# Patient Record
Sex: MALE | Race: WHITE | NOT HISPANIC OR LATINO | Employment: UNEMPLOYED | URBAN - NONMETROPOLITAN AREA
[De-identification: names, ages, dates, MRNs, and addresses within clinical notes are randomized per-mention and may not be internally consistent; named-entity substitution may affect disease eponyms.]

---

## 2024-04-17 ENCOUNTER — HOSPITAL ENCOUNTER (INPATIENT)
Facility: HOSPITAL | Age: 44
LOS: 2 days | Discharge: HOME | DRG: 917 | End: 2024-04-19
Attending: EMERGENCY MEDICINE | Admitting: INTERNAL MEDICINE
Payer: COMMERCIAL

## 2024-04-17 ENCOUNTER — APPOINTMENT (OUTPATIENT)
Dept: GENERAL RADIOLOGY | Facility: HOSPITAL | Age: 44
DRG: 917 | End: 2024-04-17
Payer: COMMERCIAL

## 2024-04-17 ENCOUNTER — APPOINTMENT (OUTPATIENT)
Dept: CT IMAGING | Facility: HOSPITAL | Age: 44
DRG: 917 | End: 2024-04-17
Payer: COMMERCIAL

## 2024-04-17 DIAGNOSIS — J18.9 COMMUNITY ACQUIRED PNEUMONIA, UNSPECIFIED LATERALITY: ICD-10-CM

## 2024-04-17 DIAGNOSIS — Z79.899 POLYPHARMACY: ICD-10-CM

## 2024-04-17 DIAGNOSIS — N17.9 AKI (ACUTE KIDNEY INJURY): ICD-10-CM

## 2024-04-17 DIAGNOSIS — G47.33 OBSTRUCTIVE SLEEP APNEA: Primary | ICD-10-CM

## 2024-04-17 PROBLEM — J96.90 RESPIRATORY FAILURE: Status: ACTIVE | Noted: 2024-04-17

## 2024-04-17 LAB
A-A DO2: 40.8 MMHG (ref 0–300)
ALBUMIN SERPL-MCNC: 4.1 G/DL (ref 3.5–5.2)
ALBUMIN/GLOB SERPL: 1.8 G/DL
ALP SERPL-CCNC: 102 U/L (ref 39–117)
ALT SERPL W P-5'-P-CCNC: 30 U/L (ref 1–41)
AMPHET+METHAMPHET UR QL: NEGATIVE
AMPHETAMINES UR QL: NEGATIVE
ANION GAP SERPL CALCULATED.3IONS-SCNC: 9.9 MMOL/L (ref 5–15)
APTT PPP: 27 SECONDS (ref 26.5–34.5)
ARTERIAL PATENCY WRIST A: ABNORMAL
AST SERPL-CCNC: 36 U/L (ref 1–40)
ATMOSPHERIC PRESS: 727 MMHG
BARBITURATES UR QL SCN: NEGATIVE
BASE EXCESS BLDA CALC-SCNC: 2.7 MMOL/L (ref 0–2)
BASOPHILS # BLD AUTO: 0.05 10*3/MM3 (ref 0–0.2)
BASOPHILS NFR BLD AUTO: 0.4 % (ref 0–1.5)
BDY SITE: ABNORMAL
BENZODIAZ UR QL SCN: POSITIVE
BILIRUB SERPL-MCNC: 0.4 MG/DL (ref 0–1.2)
BILIRUB UR QL STRIP: ABNORMAL
BUN SERPL-MCNC: 10 MG/DL (ref 6–20)
BUN/CREAT SERPL: 7.2 (ref 7–25)
BUPRENORPHINE SERPL-MCNC: POSITIVE NG/ML
CALCIUM SPEC-SCNC: 9.2 MG/DL (ref 8.6–10.5)
CANNABINOIDS SERPL QL: NEGATIVE
CHLORIDE SERPL-SCNC: 103 MMOL/L (ref 98–107)
CLARITY UR: CLEAR
CO2 BLDA-SCNC: 29.4 MMOL/L (ref 22–33)
CO2 SERPL-SCNC: 28.1 MMOL/L (ref 22–29)
COCAINE UR QL: NEGATIVE
COHGB MFR BLD: 1.3 % (ref 0–5)
COLOR UR: ABNORMAL
CREAT SERPL-MCNC: 1.39 MG/DL (ref 0.76–1.27)
CRP SERPL-MCNC: 0.59 MG/DL (ref 0–0.5)
D-LACTATE SERPL-SCNC: 1.7 MMOL/L (ref 0.5–2)
DEPRECATED RDW RBC AUTO: 45.1 FL (ref 37–54)
EGFRCR SERPLBLD CKD-EPI 2021: 63.3 ML/MIN/1.73
EOSINOPHIL # BLD AUTO: 0.29 10*3/MM3 (ref 0–0.4)
EOSINOPHIL NFR BLD AUTO: 2.6 % (ref 0.3–6.2)
ERYTHROCYTE [DISTWIDTH] IN BLOOD BY AUTOMATED COUNT: 14 % (ref 12.3–15.4)
ETHANOL BLD-MCNC: <10 MG/DL (ref 0–10)
ETHANOL UR QL: <0.01 %
FENTANYL UR-MCNC: NEGATIVE NG/ML
FLUAV RNA RESP QL NAA+PROBE: NOT DETECTED
FLUBV RNA RESP QL NAA+PROBE: NOT DETECTED
GEN 5 2HR TROPONIN T REFLEX: 25 NG/L
GLOBULIN UR ELPH-MCNC: 2.3 GM/DL
GLUCOSE BLDC GLUCOMTR-MCNC: 95 MG/DL (ref 70–130)
GLUCOSE SERPL-MCNC: 107 MG/DL (ref 65–99)
GLUCOSE UR STRIP-MCNC: NEGATIVE MG/DL
HCO3 BLDA-SCNC: 28 MMOL/L (ref 20–26)
HCT VFR BLD AUTO: 51.7 % (ref 37.5–51)
HCT VFR BLD CALC: 54 % (ref 38–51)
HGB BLD-MCNC: 17 G/DL (ref 13–17.7)
HGB BLDA-MCNC: 17.6 G/DL (ref 14–18)
HGB UR QL STRIP.AUTO: NEGATIVE
HOLD SPECIMEN: NORMAL
HOLD SPECIMEN: NORMAL
IMM GRANULOCYTES # BLD AUTO: 0.03 10*3/MM3 (ref 0–0.05)
IMM GRANULOCYTES NFR BLD AUTO: 0.3 % (ref 0–0.5)
INHALED O2 CONCENTRATION: 21 %
INR PPP: 0.98 (ref 0.9–1.1)
KETONES UR QL STRIP: NEGATIVE
LEUKOCYTE ESTERASE UR QL STRIP.AUTO: NEGATIVE
LYMPHOCYTES # BLD AUTO: 1.31 10*3/MM3 (ref 0.7–3.1)
LYMPHOCYTES NFR BLD AUTO: 11.6 % (ref 19.6–45.3)
Lab: ABNORMAL
Lab: ABNORMAL
MAGNESIUM SERPL-MCNC: 1.5 MG/DL (ref 1.6–2.6)
MCH RBC QN AUTO: 29 PG (ref 26.6–33)
MCHC RBC AUTO-ENTMCNC: 32.9 G/DL (ref 31.5–35.7)
MCV RBC AUTO: 88.2 FL (ref 79–97)
METHADONE UR QL SCN: NEGATIVE
METHGB BLD QL: 0 % (ref 0–3)
MODALITY: ABNORMAL
MONOCYTES # BLD AUTO: 0.75 10*3/MM3 (ref 0.1–0.9)
MONOCYTES NFR BLD AUTO: 6.6 % (ref 5–12)
NEUTROPHILS NFR BLD AUTO: 78.5 % (ref 42.7–76)
NEUTROPHILS NFR BLD AUTO: 8.89 10*3/MM3 (ref 1.7–7)
NITRITE UR QL STRIP: NEGATIVE
NOTIFIED BY: ABNORMAL
NOTIFIED WHO: ABNORMAL
NRBC BLD AUTO-RTO: 0 /100 WBC (ref 0–0.2)
OPIATES UR QL: NEGATIVE
OXYCODONE UR QL SCN: NEGATIVE
OXYHGB MFR BLDV: 88.2 % (ref 94–99)
PCO2 BLDA: 44.2 MM HG (ref 35–45)
PCO2 TEMP ADJ BLD: ABNORMAL MM[HG]
PCP UR QL SCN: NEGATIVE
PH BLDA: 7.41 PH UNITS (ref 7.35–7.45)
PH UR STRIP.AUTO: 5.5 [PH] (ref 5–8)
PH, TEMP CORRECTED: ABNORMAL
PLATELET # BLD AUTO: 190 10*3/MM3 (ref 140–450)
PMV BLD AUTO: 9.3 FL (ref 6–12)
PO2 BLDA: 52.2 MM HG (ref 83–108)
PO2 TEMP ADJ BLD: ABNORMAL MM[HG]
POTASSIUM SERPL-SCNC: 4.2 MMOL/L (ref 3.5–5.2)
PROCALCITONIN SERPL-MCNC: 0.08 NG/ML (ref 0–0.25)
PROT SERPL-MCNC: 6.4 G/DL (ref 6–8.5)
PROT UR QL STRIP: NEGATIVE
PROTHROMBIN TIME: 13.5 SECONDS (ref 12.1–14.7)
QT INTERVAL: 288 MS
QTC INTERVAL: 403 MS
RBC # BLD AUTO: 5.86 10*6/MM3 (ref 4.14–5.8)
RSV RNA RESP QL NAA+PROBE: NOT DETECTED
SAO2 % BLDCOA: 89.4 % (ref 94–99)
SARS-COV-2 RNA RESP QL NAA+PROBE: NOT DETECTED
SODIUM SERPL-SCNC: 141 MMOL/L (ref 136–145)
SP GR UR STRIP: 1.02 (ref 1–1.03)
TRICYCLICS UR QL SCN: NEGATIVE
TROPONIN T DELTA: 12 NG/L
TROPONIN T SERPL HS-MCNC: 13 NG/L
TSH SERPL DL<=0.05 MIU/L-ACNC: 1.93 UIU/ML (ref 0.27–4.2)
UROBILINOGEN UR QL STRIP: ABNORMAL
VENTILATOR MODE: ABNORMAL
WBC NRBC COR # BLD AUTO: 11.32 10*3/MM3 (ref 3.4–10.8)
WHOLE BLOOD HOLD COAG: NORMAL
WHOLE BLOOD HOLD SPECIMEN: NORMAL

## 2024-04-17 PROCEDURE — 70450 CT HEAD/BRAIN W/O DYE: CPT

## 2024-04-17 PROCEDURE — 93010 ELECTROCARDIOGRAM REPORT: CPT | Performed by: INTERNAL MEDICINE

## 2024-04-17 PROCEDURE — 82948 REAGENT STRIP/BLOOD GLUCOSE: CPT

## 2024-04-17 PROCEDURE — 25010000002 CEFTRIAXONE PER 250 MG: Performed by: STUDENT IN AN ORGANIZED HEALTH CARE EDUCATION/TRAINING PROGRAM

## 2024-04-17 PROCEDURE — 83735 ASSAY OF MAGNESIUM: CPT | Performed by: PHYSICIAN ASSISTANT

## 2024-04-17 PROCEDURE — 94660 CPAP INITIATION&MGMT: CPT

## 2024-04-17 PROCEDURE — 87040 BLOOD CULTURE FOR BACTERIA: CPT | Performed by: PHYSICIAN ASSISTANT

## 2024-04-17 PROCEDURE — 94799 UNLISTED PULMONARY SVC/PX: CPT

## 2024-04-17 PROCEDURE — 70450 CT HEAD/BRAIN W/O DYE: CPT | Performed by: RADIOLOGY

## 2024-04-17 PROCEDURE — 81003 URINALYSIS AUTO W/O SCOPE: CPT | Performed by: PHYSICIAN ASSISTANT

## 2024-04-17 PROCEDURE — 84484 ASSAY OF TROPONIN QUANT: CPT | Performed by: PHYSICIAN ASSISTANT

## 2024-04-17 PROCEDURE — 93005 ELECTROCARDIOGRAM TRACING: CPT | Performed by: INTERNAL MEDICINE

## 2024-04-17 PROCEDURE — 85730 THROMBOPLASTIN TIME PARTIAL: CPT | Performed by: PHYSICIAN ASSISTANT

## 2024-04-17 PROCEDURE — 85025 COMPLETE CBC W/AUTO DIFF WBC: CPT | Performed by: PHYSICIAN ASSISTANT

## 2024-04-17 PROCEDURE — 84300 ASSAY OF URINE SODIUM: CPT | Performed by: INTERNAL MEDICINE

## 2024-04-17 PROCEDURE — 25810000003 SODIUM CHLORIDE 0.9 % SOLUTION: Performed by: STUDENT IN AN ORGANIZED HEALTH CARE EDUCATION/TRAINING PROGRAM

## 2024-04-17 PROCEDURE — 82805 BLOOD GASES W/O2 SATURATION: CPT

## 2024-04-17 PROCEDURE — 25810000003 SODIUM CHLORIDE 0.9 % SOLUTION: Performed by: INTERNAL MEDICINE

## 2024-04-17 PROCEDURE — 71045 X-RAY EXAM CHEST 1 VIEW: CPT

## 2024-04-17 PROCEDURE — 85610 PROTHROMBIN TIME: CPT | Performed by: PHYSICIAN ASSISTANT

## 2024-04-17 PROCEDURE — 25010000002 MAGNESIUM SULFATE IN D5W 1G/100ML (PREMIX) 1-5 GM/100ML-% SOLUTION: Performed by: STUDENT IN AN ORGANIZED HEALTH CARE EDUCATION/TRAINING PROGRAM

## 2024-04-17 PROCEDURE — 84402 ASSAY OF FREE TESTOSTERONE: CPT | Performed by: PHYSICIAN ASSISTANT

## 2024-04-17 PROCEDURE — 83050 HGB METHEMOGLOBIN QUAN: CPT

## 2024-04-17 PROCEDURE — 80053 COMPREHEN METABOLIC PANEL: CPT | Performed by: PHYSICIAN ASSISTANT

## 2024-04-17 PROCEDURE — 71275 CT ANGIOGRAPHY CHEST: CPT | Performed by: RADIOLOGY

## 2024-04-17 PROCEDURE — 25010000002 METHYLPREDNISOLONE PER 40 MG: Performed by: INTERNAL MEDICINE

## 2024-04-17 PROCEDURE — 36415 COLL VENOUS BLD VENIPUNCTURE: CPT

## 2024-04-17 PROCEDURE — 84443 ASSAY THYROID STIM HORMONE: CPT | Performed by: PHYSICIAN ASSISTANT

## 2024-04-17 PROCEDURE — 5A09357 ASSISTANCE WITH RESPIRATORY VENTILATION, LESS THAN 24 CONSECUTIVE HOURS, CONTINUOUS POSITIVE AIRWAY PRESSURE: ICD-10-PCS | Performed by: INTERNAL MEDICINE

## 2024-04-17 PROCEDURE — 87637 SARSCOV2&INF A&B&RSV AMP PRB: CPT | Performed by: PHYSICIAN ASSISTANT

## 2024-04-17 PROCEDURE — 82077 ASSAY SPEC XCP UR&BREATH IA: CPT | Performed by: PHYSICIAN ASSISTANT

## 2024-04-17 PROCEDURE — 86140 C-REACTIVE PROTEIN: CPT | Performed by: PHYSICIAN ASSISTANT

## 2024-04-17 PROCEDURE — 93005 ELECTROCARDIOGRAM TRACING: CPT | Performed by: PHYSICIAN ASSISTANT

## 2024-04-17 PROCEDURE — 82375 ASSAY CARBOXYHB QUANT: CPT

## 2024-04-17 PROCEDURE — 25010000002 PIPERACILLIN SOD-TAZOBACTAM PER 1 G: Performed by: INTERNAL MEDICINE

## 2024-04-17 PROCEDURE — 84145 PROCALCITONIN (PCT): CPT | Performed by: PHYSICIAN ASSISTANT

## 2024-04-17 PROCEDURE — 99223 1ST HOSP IP/OBS HIGH 75: CPT | Performed by: INTERNAL MEDICINE

## 2024-04-17 PROCEDURE — 25510000001 IOPAMIDOL PER 1 ML: Performed by: STUDENT IN AN ORGANIZED HEALTH CARE EDUCATION/TRAINING PROGRAM

## 2024-04-17 PROCEDURE — 25010000002 ENOXAPARIN PER 10 MG: Performed by: INTERNAL MEDICINE

## 2024-04-17 PROCEDURE — 80307 DRUG TEST PRSMV CHEM ANLYZR: CPT | Performed by: PHYSICIAN ASSISTANT

## 2024-04-17 PROCEDURE — 94664 DEMO&/EVAL PT USE INHALER: CPT

## 2024-04-17 PROCEDURE — 71275 CT ANGIOGRAPHY CHEST: CPT

## 2024-04-17 PROCEDURE — 25010000002 NALOXONE PER 1 MG: Performed by: PHYSICIAN ASSISTANT

## 2024-04-17 PROCEDURE — 82570 ASSAY OF URINE CREATININE: CPT | Performed by: INTERNAL MEDICINE

## 2024-04-17 PROCEDURE — 36600 WITHDRAWAL OF ARTERIAL BLOOD: CPT

## 2024-04-17 PROCEDURE — 83605 ASSAY OF LACTIC ACID: CPT | Performed by: PHYSICIAN ASSISTANT

## 2024-04-17 PROCEDURE — 84403 ASSAY OF TOTAL TESTOSTERONE: CPT | Performed by: PHYSICIAN ASSISTANT

## 2024-04-17 PROCEDURE — 25010000002 MAGNESIUM SULFATE 4 GM/100ML SOLUTION: Performed by: INTERNAL MEDICINE

## 2024-04-17 PROCEDURE — 99285 EMERGENCY DEPT VISIT HI MDM: CPT

## 2024-04-17 PROCEDURE — 71045 X-RAY EXAM CHEST 1 VIEW: CPT | Performed by: RADIOLOGY

## 2024-04-17 PROCEDURE — 25810000003 SODIUM CHLORIDE 0.9 % SOLUTION: Performed by: PHYSICIAN ASSISTANT

## 2024-04-17 RX ORDER — IPRATROPIUM BROMIDE AND ALBUTEROL SULFATE 2.5; .5 MG/3ML; MG/3ML
3 SOLUTION RESPIRATORY (INHALATION)
Status: DISCONTINUED | OUTPATIENT
Start: 2024-04-17 | End: 2024-04-19 | Stop reason: HOSPADM

## 2024-04-17 RX ORDER — BISACODYL 10 MG
10 SUPPOSITORY, RECTAL RECTAL DAILY PRN
Status: DISCONTINUED | OUTPATIENT
Start: 2024-04-17 | End: 2024-04-19 | Stop reason: HOSPADM

## 2024-04-17 RX ORDER — ENOXAPARIN SODIUM 100 MG/ML
40 INJECTION SUBCUTANEOUS DAILY
Status: DISCONTINUED | OUTPATIENT
Start: 2024-04-17 | End: 2024-04-19 | Stop reason: HOSPADM

## 2024-04-17 RX ORDER — NICOTINE POLACRILEX 4 MG
15 LOZENGE BUCCAL
Status: DISCONTINUED | OUTPATIENT
Start: 2024-04-17 | End: 2024-04-19 | Stop reason: HOSPADM

## 2024-04-17 RX ORDER — POLYETHYLENE GLYCOL 3350 17 G/17G
17 POWDER, FOR SOLUTION ORAL DAILY PRN
Status: DISCONTINUED | OUTPATIENT
Start: 2024-04-17 | End: 2024-04-19 | Stop reason: HOSPADM

## 2024-04-17 RX ORDER — MAGNESIUM SULFATE 1 G/100ML
1 INJECTION INTRAVENOUS ONCE
Status: COMPLETED | OUTPATIENT
Start: 2024-04-17 | End: 2024-04-17

## 2024-04-17 RX ORDER — AMOXICILLIN 250 MG
2 CAPSULE ORAL 2 TIMES DAILY
Status: DISCONTINUED | OUTPATIENT
Start: 2024-04-17 | End: 2024-04-19 | Stop reason: HOSPADM

## 2024-04-17 RX ORDER — SODIUM CHLORIDE 0.9 % (FLUSH) 0.9 %
10 SYRINGE (ML) INJECTION AS NEEDED
Status: DISCONTINUED | OUTPATIENT
Start: 2024-04-17 | End: 2024-04-19 | Stop reason: HOSPADM

## 2024-04-17 RX ORDER — MAGNESIUM SULFATE HEPTAHYDRATE 40 MG/ML
4 INJECTION, SOLUTION INTRAVENOUS ONCE
Status: COMPLETED | OUTPATIENT
Start: 2024-04-17 | End: 2024-04-17

## 2024-04-17 RX ORDER — NALOXONE HCL 0.4 MG/ML
0.4 VIAL (ML) INJECTION ONCE
Status: COMPLETED | OUTPATIENT
Start: 2024-04-17 | End: 2024-04-17

## 2024-04-17 RX ORDER — BISACODYL 5 MG/1
5 TABLET, DELAYED RELEASE ORAL DAILY PRN
Status: DISCONTINUED | OUTPATIENT
Start: 2024-04-17 | End: 2024-04-19 | Stop reason: HOSPADM

## 2024-04-17 RX ORDER — SODIUM CHLORIDE 9 MG/ML
125 INJECTION, SOLUTION INTRAVENOUS CONTINUOUS
Status: DISCONTINUED | OUTPATIENT
Start: 2024-04-17 | End: 2024-04-19 | Stop reason: HOSPADM

## 2024-04-17 RX ORDER — SODIUM CHLORIDE 9 MG/ML
40 INJECTION, SOLUTION INTRAVENOUS AS NEEDED
Status: DISCONTINUED | OUTPATIENT
Start: 2024-04-17 | End: 2024-04-19 | Stop reason: HOSPADM

## 2024-04-17 RX ORDER — DEXTROSE MONOHYDRATE 25 G/50ML
25 INJECTION, SOLUTION INTRAVENOUS
Status: DISCONTINUED | OUTPATIENT
Start: 2024-04-17 | End: 2024-04-19 | Stop reason: HOSPADM

## 2024-04-17 RX ORDER — SODIUM CHLORIDE 0.9 % (FLUSH) 0.9 %
10 SYRINGE (ML) INJECTION EVERY 12 HOURS SCHEDULED
Status: DISCONTINUED | OUTPATIENT
Start: 2024-04-17 | End: 2024-04-19 | Stop reason: HOSPADM

## 2024-04-17 RX ORDER — METHYLPREDNISOLONE SODIUM SUCCINATE 40 MG/ML
40 INJECTION, POWDER, LYOPHILIZED, FOR SOLUTION INTRAMUSCULAR; INTRAVENOUS EVERY 12 HOURS
Status: COMPLETED | OUTPATIENT
Start: 2024-04-17 | End: 2024-04-18

## 2024-04-17 RX ORDER — GLUCAGON 1 MG/ML
1 KIT INJECTION
Status: DISCONTINUED | OUTPATIENT
Start: 2024-04-17 | End: 2024-04-19 | Stop reason: HOSPADM

## 2024-04-17 RX ORDER — NITROGLYCERIN 0.4 MG/1
0.4 TABLET SUBLINGUAL
Status: DISCONTINUED | OUTPATIENT
Start: 2024-04-17 | End: 2024-04-19 | Stop reason: HOSPADM

## 2024-04-17 RX ADMIN — SODIUM CHLORIDE 1000 ML: 9 INJECTION, SOLUTION INTRAVENOUS at 14:15

## 2024-04-17 RX ADMIN — NALOXONE HYDROCHLORIDE 0.4 MG: 0.4 INJECTION, SOLUTION INTRAMUSCULAR; INTRAVENOUS; SUBCUTANEOUS at 14:15

## 2024-04-17 RX ADMIN — SODIUM CHLORIDE 75 ML/HR: 9 INJECTION, SOLUTION INTRAVENOUS at 20:28

## 2024-04-17 RX ADMIN — ENOXAPARIN SODIUM 40 MG: 40 INJECTION SUBCUTANEOUS at 20:22

## 2024-04-17 RX ADMIN — IPRATROPIUM BROMIDE AND ALBUTEROL SULFATE 3 ML: 2.5; .5 SOLUTION RESPIRATORY (INHALATION) at 19:39

## 2024-04-17 RX ADMIN — MAGNESIUM SULFATE HEPTAHYDRATE 4 G: 40 INJECTION, SOLUTION INTRAVENOUS at 20:22

## 2024-04-17 RX ADMIN — MAGNESIUM SULFATE HEPTAHYDRATE 1 G: 1 INJECTION, SOLUTION INTRAVENOUS at 16:14

## 2024-04-17 RX ADMIN — Medication 10 ML: at 20:23

## 2024-04-17 RX ADMIN — METHYLPREDNISOLONE SODIUM SUCCINATE 40 MG: 40 INJECTION, POWDER, FOR SOLUTION INTRAMUSCULAR; INTRAVENOUS at 20:22

## 2024-04-17 RX ADMIN — SODIUM CHLORIDE 1000 ML: 9 INJECTION, SOLUTION INTRAVENOUS at 16:14

## 2024-04-17 RX ADMIN — IOPAMIDOL 85 ML: 755 INJECTION, SOLUTION INTRAVENOUS at 16:07

## 2024-04-17 RX ADMIN — PIPERACILLIN SODIUM AND TAZOBACTAM SODIUM 3.38 G: 3; .375 INJECTION, POWDER, LYOPHILIZED, FOR SOLUTION INTRAVENOUS at 20:21

## 2024-04-17 RX ADMIN — CEFTRIAXONE 1000 MG: 1 INJECTION, POWDER, FOR SOLUTION INTRAMUSCULAR; INTRAVENOUS at 18:29

## 2024-04-17 NOTE — ED PROVIDER NOTES
Subjective   History of Present Illness  44-year-old male presents secondary to altered mental status with low heart rate and low oxygen level.  Patient's significant other brought him in by private vehicle.  She provides all of his history.  Patient is awake alert though confused.  Patient's baseline status is a normal historian with a past medical history of obstructive sleep apnea, hypertension, chronic low back pain.  Patient apparently does abuse Xanax.  He does take Neurontin 600 mg along with Suboxone according to his significant other.  Patient is snorting the Xanax.  She is concerned that he might have taken too much Xanax.  Patient presented to her residence around 8.  He was confused.  He progressively got worse.  She felt that this could have been due to oversedation and his CO2 level rising where he was not on his CPAP.  She was going to take him to get his CPAP however he progressively got worse so she brought him to the emergency department.  Patient is on testosterone and on human growth hormone.  He had not been sick recently.  He does have an abrasion on his right forehead.  He states he struck a cabinet with his head.      Review of Systems   Constitutional: Negative.  Negative for fever.   HENT: Negative.     Respiratory: Negative.     Cardiovascular: Negative.  Negative for chest pain.   Gastrointestinal: Negative.  Negative for abdominal pain.   Endocrine: Negative.    Genitourinary: Negative.  Negative for dysuria.   Skin: Negative.    Neurological: Negative.    Psychiatric/Behavioral: Negative.     All other systems reviewed and are negative.      Past Medical History:   Diagnosis Date    Depression     Opiate dependence     KANDICE (obstructive sleep apnea)        No Known Allergies    History reviewed. No pertinent surgical history.    History reviewed. No pertinent family history.    Social History     Socioeconomic History    Marital status:    Tobacco Use    Smoking status: Never     Smokeless tobacco: Current   Vaping Use    Vaping status: Never Used   Substance and Sexual Activity    Alcohol use: Not Currently    Drug use: Yes     Types: Benzodiazepines           Objective   Physical Exam  Vitals and nursing note reviewed.   Constitutional:       General: He is in acute distress.      Appearance: He is well-developed. He is ill-appearing, toxic-appearing and diaphoretic.   HENT:      Head: Normocephalic and atraumatic.      Right Ear: External ear normal.      Left Ear: External ear normal.      Nose: Nose normal.   Eyes:      Conjunctiva/sclera: Conjunctivae normal.      Pupils: Pupils are equal, round, and reactive to light.   Neck:      Vascular: No JVD.      Trachea: No tracheal deviation.   Cardiovascular:      Rate and Rhythm: Normal rate and regular rhythm.      Heart sounds: Normal heart sounds. No murmur heard.  Pulmonary:      Effort: Pulmonary effort is normal. No respiratory distress.      Breath sounds: Normal breath sounds. No wheezing.   Abdominal:      General: Bowel sounds are normal.      Palpations: Abdomen is soft.      Tenderness: There is no abdominal tenderness.   Musculoskeletal:         General: No deformity. Normal range of motion.      Cervical back: Normal range of motion and neck supple.   Skin:     General: Skin is warm and dry.      Coloration: Skin is not pale.      Findings: No erythema or rash.      Comments: Very red-faced.   Neurological:      Mental Status: He is alert.      Cranial Nerves: No cranial nerve deficit.      Comments: Awake alert though confused.   Psychiatric:         Behavior: Behavior normal.         Thought Content: Thought content normal.         Procedures       Results for orders placed or performed during the hospital encounter of 04/17/24   COVID-19, FLU A/B, RSV PCR 1 HR TAT - Swab, Nasopharynx    Specimen: Nasopharynx; Swab   Result Value Ref Range    COVID19 Not Detected Not Detected - Ref. Range    Influenza A PCR Not Detected Not  Detected    Influenza B PCR Not Detected Not Detected    RSV, PCR Not Detected Not Detected   Comprehensive Metabolic Panel    Specimen: Blood   Result Value Ref Range    Glucose 107 (H) 65 - 99 mg/dL    BUN 10 6 - 20 mg/dL    Creatinine 1.39 (H) 0.76 - 1.27 mg/dL    Sodium 141 136 - 145 mmol/L    Potassium 4.2 3.5 - 5.2 mmol/L    Chloride 103 98 - 107 mmol/L    CO2 28.1 22.0 - 29.0 mmol/L    Calcium 9.2 8.6 - 10.5 mg/dL    Total Protein 6.4 6.0 - 8.5 g/dL    Albumin 4.1 3.5 - 5.2 g/dL    ALT (SGPT) 30 1 - 41 U/L    AST (SGOT) 36 1 - 40 U/L    Alkaline Phosphatase 102 39 - 117 U/L    Total Bilirubin 0.4 0.0 - 1.2 mg/dL    Globulin 2.3 gm/dL    A/G Ratio 1.8 g/dL    BUN/Creatinine Ratio 7.2 7.0 - 25.0    Anion Gap 9.9 5.0 - 15.0 mmol/L    eGFR 63.3 >60.0 mL/min/1.73   Protime-INR    Specimen: Blood   Result Value Ref Range    Protime 13.5 12.1 - 14.7 Seconds    INR 0.98 0.90 - 1.10   aPTT    Specimen: Blood   Result Value Ref Range    PTT 27.0 26.5 - 34.5 seconds   Lactic Acid, Plasma    Specimen: Blood   Result Value Ref Range    Lactate 1.7 0.5 - 2.0 mmol/L   Procalcitonin    Specimen: Blood   Result Value Ref Range    Procalcitonin 0.08 0.00 - 0.25 ng/mL   High Sensitivity Troponin T    Specimen: Blood   Result Value Ref Range    HS Troponin T 13 <22 ng/L   C-reactive Protein    Specimen: Blood   Result Value Ref Range    C-Reactive Protein 0.59 (H) 0.00 - 0.50 mg/dL   Urinalysis With Culture If Indicated - Urine, Clean Catch    Specimen: Urine, Clean Catch   Result Value Ref Range    Color, UA Dark Yellow (A) Yellow, Straw    Appearance, UA Clear Clear    pH, UA 5.5 5.0 - 8.0    Specific Gravity, UA 1.024 1.005 - 1.030    Glucose, UA Negative Negative    Ketones, UA Negative Negative    Bilirubin, UA Small (1+) (A) Negative    Blood, UA Negative Negative    Protein, UA Negative Negative    Leuk Esterase, UA Negative Negative    Nitrite, UA Negative Negative    Urobilinogen, UA 1.0 E.U./dL 0.2 - 1.0 E.U./dL    Ethanol    Specimen: Blood   Result Value Ref Range    Ethanol <10 0 - 10 mg/dL    Ethanol % <0.010 %   Urine Drug Screen - Urine, Clean Catch    Specimen: Urine, Clean Catch   Result Value Ref Range    THC, Screen, Urine Negative Negative    Phencyclidine (PCP), Urine Negative Negative    Cocaine Screen, Urine Negative Negative    Methamphetamine, Ur Negative Negative    Opiate Screen Negative Negative    Amphetamine Screen, Urine Negative Negative    Benzodiazepine Screen, Urine Positive (A) Negative    Tricyclic Antidepressants Screen Negative Negative    Methadone Screen, Urine Negative Negative    Barbiturates Screen, Urine Negative Negative    Oxycodone Screen, Urine Negative Negative    Buprenorphine, Screen, Urine Positive (A) Negative   Magnesium    Specimen: Blood   Result Value Ref Range    Magnesium 1.5 (L) 1.6 - 2.6 mg/dL   TSH    Specimen: Blood   Result Value Ref Range    TSH 1.930 0.270 - 4.200 uIU/mL   CBC Auto Differential    Specimen: Blood   Result Value Ref Range    WBC 11.32 (H) 3.40 - 10.80 10*3/mm3    RBC 5.86 (H) 4.14 - 5.80 10*6/mm3    Hemoglobin 17.0 13.0 - 17.7 g/dL    Hematocrit 51.7 (H) 37.5 - 51.0 %    MCV 88.2 79.0 - 97.0 fL    MCH 29.0 26.6 - 33.0 pg    MCHC 32.9 31.5 - 35.7 g/dL    RDW 14.0 12.3 - 15.4 %    RDW-SD 45.1 37.0 - 54.0 fl    MPV 9.3 6.0 - 12.0 fL    Platelets 190 140 - 450 10*3/mm3    Neutrophil % 78.5 (H) 42.7 - 76.0 %    Lymphocyte % 11.6 (L) 19.6 - 45.3 %    Monocyte % 6.6 5.0 - 12.0 %    Eosinophil % 2.6 0.3 - 6.2 %    Basophil % 0.4 0.0 - 1.5 %    Immature Grans % 0.3 0.0 - 0.5 %    Neutrophils, Absolute 8.89 (H) 1.70 - 7.00 10*3/mm3    Lymphocytes, Absolute 1.31 0.70 - 3.10 10*3/mm3    Monocytes, Absolute 0.75 0.10 - 0.90 10*3/mm3    Eosinophils, Absolute 0.29 0.00 - 0.40 10*3/mm3    Basophils, Absolute 0.05 0.00 - 0.20 10*3/mm3    Immature Grans, Absolute 0.03 0.00 - 0.05 10*3/mm3    nRBC 0.0 0.0 - 0.2 /100 WBC   Blood Gas, Arterial With Co-Ox    Specimen:  Arterial Blood   Result Value Ref Range    Site Left Brachial     Bharath's Test N/A     pH, Arterial 7.411 7.350 - 7.450 pH units    pCO2, Arterial 44.2 35.0 - 45.0 mm Hg    pO2, Arterial 52.2 (C) 83.0 - 108.0 mm Hg    HCO3, Arterial 28.0 (H) 20.0 - 26.0 mmol/L    Base Excess, Arterial 2.7 (H) 0.0 - 2.0 mmol/L    O2 Saturation, Arterial 89.4 (L) 94.0 - 99.0 %    Hemoglobin, Blood Gas 17.6 14 - 18 g/dL    Hematocrit, Blood Gas 54.0 (H) 38.0 - 51.0 %    Oxyhemoglobin 88.2 (L) 94 - 99 %    Methemoglobin 0.00 0.00 - 3.00 %    Carboxyhemoglobin 1.3 0 - 5 %    A-a DO2 40.8 0.0 - 300.0 mmHg    CO2 Content 29.4 22 - 33 mmol/L    Barometric Pressure for Blood Gas 727 mmHg    Modality Room Air     FIO2 21 %    Ventilator Mode NA     Notified Who MEGAN CHAPIN AND ER RN     Notified By [id number]     Notified Time 04/17/2024 13:57     Collected by [id number]     pH, Temp Corrected      pCO2, Temperature Corrected      pO2, Temperature Corrected     Fentanyl, Urine - Urine, Clean Catch    Specimen: Urine, Clean Catch   Result Value Ref Range    Fentanyl, Urine Negative Negative   High Sensitivity Troponin T 2Hr    Specimen: Blood   Result Value Ref Range    HS Troponin T 25 (H) <22 ng/L    Troponin T Delta 12 (C) >=-4 - <+4 ng/L   POC Glucose Once    Specimen: Blood   Result Value Ref Range    Glucose 95 70 - 130 mg/dL   ECG 12 Lead Other; Sepsis   Result Value Ref Range    QT Interval 288 ms    QTC Interval 403 ms   ECG 12 Lead Altered Mental Status   Result Value Ref Range    QT Interval 328 ms    QTC Interval 396 ms   Green Top (Gel)   Result Value Ref Range    Extra Tube Hold for add-ons.    Lavender Top   Result Value Ref Range    Extra Tube hold for add-on    Gold Top - SST   Result Value Ref Range    Extra Tube Hold for add-ons.    Light Blue Top   Result Value Ref Range    Extra Tube Hold for add-ons.        CT Angiogram Chest Pulmonary Embolism   Final Result   1. No evidence of pulmonary embolus or aortic dissection.   2. Patchy  basilar airspace disease consistent with pneumonia.                   This report was finalized on 4/17/2024 4:14 PM by Elysia Leger M.D..          XR Chest 1 View   Final Result   Low lung volumes with left basilar atelectasis or pneumonia.           This report was finalized on 4/17/2024 3:11 PM by Elysia Leger M.D..          CT Head Without Contrast   Final Result   No acute intracranial process.                   This report was finalized on 4/17/2024 2:55 PM by Elysia Leger M.D..              ED Course  ED Course as of 04/17/24 2014 Wed Apr 17, 2024   1433 ECG 12 Lead Other; Sepsis  Vent. Rate : 118 BPM     Atrial Rate : 118 BPM     P-R Int : 116 ms          QRS Dur :  88 ms      QT Int : 288 ms       P-R-T Axes :  54  49   5 degrees     QTc Int : 403 ms     Sinus tachycardia  Nonspecific T wave abnormality  Abnormal ECG  No previous ECGs available   [ES]   1747 Patient currently still remains on 2 L nasal cannula oxygen with saturations of 93%.  -CTA was negative for PE however there is evidence of what appears to be bibasilar pneumonia more prominent on the right lung base in the left  -Still remains somnolent likely polypharmacy induced given benzos and buprenorphine abuse.    -Currently patient's mother and father at bedside and are agreeable to admit pt   -Electronically signed by Luana Guzmán DO, 04/17/24, 5:48 PM EDT.   [LK]   9144 Cardiac enzyme was 13 with repeat at 25 likely demand driven given persistent tachycardia however patient now is down below 100 bpm, current hr 96bpm.  Electronically signed by Luana Guzmán DO, 04/17/24, 5:50 PM EDT.    Magnesium was also replaced with 1 g IVP  x1  [LK]      ED Course User Index  [ES] Kee Smith MD  [LK] Luana Guzmán DO                                     [facility] reviewed by Radha Benoit DO, [place], Edwin Palmore, MD, Luana Guzmán DO       Medical Decision Making  Problems Addressed:  Community  acquired pneumonia, unspecified laterality: complicated acute illness or injury  Obstructive sleep apnea: complicated acute illness or injury  Polypharmacy: complicated acute illness or injury    Amount and/or Complexity of Data Reviewed  Labs: ordered.  Radiology: ordered.  ECG/medicine tests: ordered. Decision-making details documented in ED Course.    Risk  Prescription drug management.  Decision regarding hospitalization.        Final diagnoses:   Obstructive sleep apnea   Polypharmacy   Community acquired pneumonia, unspecified laterality       ED Disposition  ED Disposition       ED Disposition   Decision to Admit    Condition   --    Comment   Level of Care: Progressive Care [20]   Diagnosis: Respiratory failure [795636]   Admitting Physician: AMERICA CA [[id number]]   Certification: I Certify That Inpatient Hospital Services Are Medically Necessary For Greater Than 2 Midnights                 No follow-up provider specified.       Medication List      No changes were made to your prescriptions during this visit.            Luana Guzmán DO  04/17/24 2014

## 2024-04-17 NOTE — Clinical Note
Level of Care: Medical Telemetry [23]   Diagnosis: Respiratory failure [339551]   Admitting Physician: AMERICA CA [[id number]]

## 2024-04-17 NOTE — CASE MANAGEMENT/SOCIAL WORK
Discharge Planning Assessment   [place]     Patient Name: Stoney Ochoa  MRN: [id number]  Today's Date: 4/17/2024    Admit Date: 4/17/2024    Plan: Spoke with patients family at bedside. Patient lives alone in [place], [place]. Patients spouse Arabella Hand [phone] in [place] is listed as contact. Patients parents were at bedside and was added to patients contacts. Patient has no POA but they stated he had a Living Will on his cell phone. Patient uses a cpap from unknown supplier. Patient has no oxygen or HH. Patient has no PCP or pharmacy. Patient will return to [place] when discharged and friend/family will transport.   Discharge Needs Assessment       Row Name 04/17/24 0776       Living Environment    People in Home alone    Potentially Unsafe Housing Conditions none    In the past 12 months has the electric, gas, oil, or water company threatened to shut off services in your home? Yes    Primary Care Provided by self    Provides Primary Care For no one    Family Caregiver if Needed spouse    Family Caregiver Names ARABELLA BREWER Spouse [phone]    Quality of Family Relationships unable to assess    Able to Return to Prior Arrangements yes       Resource/Environmental Concerns    Resource/Environmental Concerns none    Transportation Concerns none       Transportation Needs    In the past 12 months, has lack of transportation kept you from medical appointments or from getting medications? no    In the past 12 months, has lack of transportation kept you from meetings, work, or from getting things needed for daily living? No       Food Insecurity    Within the past 12 months, you worried that your food would run out before you got the money to buy more. Never true    Within the past 12 months, the food you bought just didn't last and you didn't have money to get more. Never true       Transition Planning    Patient/Family Anticipates Transition to home    Patient/Family Anticipated Services at Transition none     Transportation Anticipated family or friend will provide       Discharge Needs Assessment    Readmission Within the Last 30 Days no previous admission in last 30 days    Equipment Currently Used at Home cpap    Concerns to be Addressed discharge planning    Anticipated Changes Related to Illness none    Equipment Needed After Discharge none                   Discharge Plan       Row Name 04/17/24 1559       Plan    Plan Spoke with patients family at bedside. Patient lives alone in [place], [place]. Patients spouse Arabella Hand [phone] in [place] is listed as contact. Patients parents were at bedside and was added to patients contacts. Patient has no POA but they stated he had a Living Will on his cell phone. Patient uses a cpap from unknown supplier. Patient has no oxygen or HH. Patient has no PCP or pharmacy. Patient will return to [place] when discharged and friend/family will transport.    Patient/Family in Agreement with Plan yes                     Cristina Decker

## 2024-04-17 NOTE — H&P
[facility]   HISTORY AND PHYSICAL    Patient Name: Stoney Ochoa  : 1980  MRN: [id number]  Primary Care Physician:  Provider, No Known  Date of admission: 2024    Subjective   Subjective     Chief Complaint: Somnolence    History of Present Illness the patient is a 44-year-old male with past medical history significant for opiate dependence, depression and obstructive sleep apnea who presents to the emergency department with altered mental status and somnolence.    Patient seen and examined in . His parents are present at bedside.    According to my discussion with ED provider and per chart review, the patient has been using Xanax as illicitly.  Family concerned that patient had taken too much Xanax as today.  Patient also reportedly took 600 mg of Neurontin and Suboxone for which he is prescribed.    On my exam, the patient is very somnolent and unable to provide any history of presenting illness.  He is using his abdominal/secondary muscles of respiration. Parents are unsure of any other presenting symptoms.    Review of Systems   Unable to perform ROS: Mental status change      Personal History     Past Medical History:   Diagnosis Date    Depression     Opiate dependence     KANDICE (obstructive sleep apnea)        History reviewed. No pertinent surgical history.    Family History: family history is not on file.     Social History:  Occasional Alcohol use    Home Medications:   Awaiting pharmacy completion of home medications    Allergies:  No Known Allergies    Objective    Objective     Vitals:   Temp:  [97.9 °F (36.6 °C)] 97.9 °F (36.6 °C)  Heart Rate:  [] 96  Resp:  [17-22] 17  BP: (105-166)/(51-94) 120/66    Physical Exam  Constitutional:       General: He is not in acute distress.     Appearance: He is well-developed. He is obese. He is ill-appearing and diaphoretic.      Interventions: Nasal cannula in place.   HENT:      Head: Normocephalic and atraumatic.   Eyes:       Conjunctiva/sclera: Conjunctivae normal.   Neck:      Trachea: No tracheal deviation.   Cardiovascular:      Rate and Rhythm: Normal rate and regular rhythm.      Pulses:           Dorsalis pedis pulses are 2+ on the right side and 2+ on the left side.      Heart sounds: No murmur heard.     No friction rub. No gallop.      Comments: No significant lower extremity edema  Pulmonary:      Effort: Tachypnea and accessory muscle usage present. No respiratory distress.      Breath sounds: Decreased breath sounds and rhonchi present. No wheezing or rales.   Abdominal:      General: Abdomen is protuberant. Bowel sounds are normal. Bowel sounds are decreased. There is no distension.      Palpations: Abdomen is soft.      Tenderness: There is no abdominal tenderness. There is no guarding.   Skin:     General: Skin is warm and dry.      Findings: No erythema or rash.   Neurological:      Mental Status: He is lethargic.      Comments: Will briefly awaken to verbal and tactile stimuli but quickly falls back to sleep         Result Review    Result Review:  I have personally reviewed the results from the time of this admission to 4/17/2024 19:00 EDT and agree with these findings:  [x]  Laboratory list / accordion  []  Microbiology  [x]  Radiology  [x]  EKG/Telemetry   []  Cardiology/Vascular   []  Pathology  []  Old records  []  Other:  Most notable findings include:     EKG: NSR with non-specific ST-T changes    Results from last 7 days   Lab Units 04/17/24  1408   WBC 10*3/mm3 11.32*   HEMOGLOBIN g/dL 17.0   HEMATOCRIT % 51.7*   PLATELETS 10*3/mm3 190     Results from last 7 days   Lab Units 04/17/24  1408   SODIUM mmol/L 141   POTASSIUM mmol/L 4.2   CHLORIDE mmol/L 103   CO2 mmol/L 28.1   BUN mg/dL 10   CREATININE mg/dL 1.39*   CALCIUM mg/dL 9.2   BILIRUBIN mg/dL 0.4   ALK PHOS U/L 102   ALT (SGPT) U/L 30   AST (SGOT) U/L 36   GLUCOSE mg/dL 107*     Last Urine Toxicity          Latest Ref Rng & Units 4/17/2024   LAST URINE  TOXICITY RESULTS   Amphetamine, Urine Qual Negative Negative    Barbiturates Screen, Urine Negative Negative    Benzodiazepine Screen, Urine Negative Positive    Buprenorphine, Screen, Urine Negative Positive    Cocaine Screen, Urine Negative Negative    Fentanyl, Urine Negative Negative    Methadone Screen , Urine Negative Negative    Methamphetamine, Ur Negative Negative      CT head without contrast:  FINDINGS: There is no CT evidence of hemorrhage. There is no mass, mass  effect or midline shift. Note is made of a dilated perivascular space in  the right basal ganglia. There is no hydrocephalus. The paranasal  sinuses are clear. Bone windows reveal no acute osseous abnormalities.     IMPRESSION:  No acute intracranial process.    CT chest with PE protocol (images reviewed):  FINDINGS: There are no filling defects within the pulmonary arteries to  suggest an embolus. The thoracic aorta is normal in caliber with no  evidence of dissection. The heart is normal in size. There are no  pleural or pericardial effusions. There is no adenopathy. The thyroid  gland is unremarkable. Lung windows reveal patchy airspace disease in  the right greater than left lung bases consistent with pneumonia. The  visualized upper abdomen is unremarkable. Bone windows reveal no acute  osseous abnormalities.     IMPRESSION:  1. No evidence of pulmonary embolus or aortic dissection.  2. Patchy basilar airspace disease consistent with pneumonia.    Assessment / Plan     #Sepsis due to bilateral aspiration pneumonia  #Suspected unintentional benzodiazepine overdose  #Chronic Suboxone use/Chronic opiate dependence  #Acute hypoxemic respiratory failure  #Obstructive sleep apnea  #Mild troponin T elevation, suspect due to respiratory failure and pneumonia  #Obesity with BMI 35.30 kg/m²  #Mild hypomagnesemia  -Patient will be admitted to the progressive care unit for further evaluation and management with fall and aspiration precautions  -I have  requested that respiratory therapy place patient on CPAP at this time and it is to be worn as needed and at bedtime  -I have requested a nurse dysphagia screen as patient currently appears too lethargic for safe oral intake  -Patient will be started on scheduled nebulized inhalants and IV Solu-Medrol  -I started the patient on Zosyn monotherapy  -Will trend patient's EKG given nonspecific ST-T changes  -Repeat cbc and chemistry panel in a.m.   -Patient will receive 4 g magnesium sulfate IV x 1  -Repeat magnesium level in a.m.  -Patient will be started on IV fluid hydration with NS @75 ml/hr    DVT prophylaxis:  Lovenox    CODE STATUS:    Code Status (Patient has no pulse and is not breathing): CPR (Attempt to Resuscitate)  Medical Interventions (Patient has pulse or is breathing): Full Support    Admission Status:  I believe this patient meets inpatient status.    Patient is considered to be high risk patient due to: Acute respiratory failure/respiratory distress, unintentional benzodiazepine overdose and aspiration pneumonia    Radha Benoit,

## 2024-04-18 LAB
A-A DO2: 58.7 MMHG (ref 0–300)
ALBUMIN SERPL-MCNC: 3.8 G/DL (ref 3.5–5.2)
ALBUMIN/GLOB SERPL: 1.5 G/DL
ALP SERPL-CCNC: 91 U/L (ref 39–117)
ALT SERPL W P-5'-P-CCNC: 25 U/L (ref 1–41)
ANION GAP SERPL CALCULATED.3IONS-SCNC: 7.7 MMOL/L (ref 5–15)
ARTERIAL PATENCY WRIST A: POSITIVE
AST SERPL-CCNC: 26 U/L (ref 1–40)
ATMOSPHERIC PRESS: 727 MMHG
BASE EXCESS BLDA CALC-SCNC: -1.9 MMOL/L (ref 0–2)
BASOPHILS # BLD AUTO: 0.04 10*3/MM3 (ref 0–0.2)
BASOPHILS NFR BLD AUTO: 0.2 % (ref 0–1.5)
BDY SITE: ABNORMAL
BILIRUB SERPL-MCNC: 0.9 MG/DL (ref 0–1.2)
BUN SERPL-MCNC: 18 MG/DL (ref 6–20)
BUN/CREAT SERPL: 8.3 (ref 7–25)
CALCIUM SPEC-SCNC: 8.7 MG/DL (ref 8.6–10.5)
CHLORIDE SERPL-SCNC: 105 MMOL/L (ref 98–107)
CO2 BLDA-SCNC: 26.7 MMOL/L (ref 22–33)
CO2 SERPL-SCNC: 24.3 MMOL/L (ref 22–29)
COHGB MFR BLD: 2 % (ref 0–5)
CPAP: 7 CMH2O
CREAT SERPL-MCNC: 2.18 MG/DL (ref 0.76–1.27)
CREAT UR-MCNC: 388.2 MG/DL
DEPRECATED RDW RBC AUTO: 47 FL (ref 37–54)
EGFRCR SERPLBLD CKD-EPI 2021: 36.9 ML/MIN/1.73
EOSINOPHIL # BLD AUTO: 0.02 10*3/MM3 (ref 0–0.4)
EOSINOPHIL NFR BLD AUTO: 0.1 % (ref 0.3–6.2)
ERYTHROCYTE [DISTWIDTH] IN BLOOD BY AUTOMATED COUNT: 14.2 % (ref 12.3–15.4)
GLOBULIN UR ELPH-MCNC: 2.5 GM/DL
GLUCOSE BLDC GLUCOMTR-MCNC: 122 MG/DL (ref 70–130)
GLUCOSE BLDC GLUCOMTR-MCNC: 137 MG/DL (ref 70–130)
GLUCOSE BLDC GLUCOMTR-MCNC: 145 MG/DL (ref 70–130)
GLUCOSE BLDC GLUCOMTR-MCNC: 179 MG/DL (ref 70–130)
GLUCOSE SERPL-MCNC: 143 MG/DL (ref 65–99)
HCO3 BLDA-SCNC: 25.1 MMOL/L (ref 20–26)
HCT VFR BLD AUTO: 48.8 % (ref 37.5–51)
HCT VFR BLD CALC: 47.6 % (ref 38–51)
HGB BLD-MCNC: 15.9 G/DL (ref 13–17.7)
HGB BLDA-MCNC: 15.5 G/DL (ref 14–18)
IMM GRANULOCYTES # BLD AUTO: 0.1 10*3/MM3 (ref 0–0.05)
IMM GRANULOCYTES NFR BLD AUTO: 0.6 % (ref 0–0.5)
INHALED O2 CONCENTRATION: 28 %
LYMPHOCYTES # BLD AUTO: 1.12 10*3/MM3 (ref 0.7–3.1)
LYMPHOCYTES NFR BLD AUTO: 6.6 % (ref 19.6–45.3)
Lab: ABNORMAL
M PNEUMO IGM SER QL: NEGATIVE
MAGNESIUM SERPL-MCNC: 2.7 MG/DL (ref 1.6–2.6)
MCH RBC QN AUTO: 29.5 PG (ref 26.6–33)
MCHC RBC AUTO-ENTMCNC: 32.6 G/DL (ref 31.5–35.7)
MCV RBC AUTO: 90.5 FL (ref 79–97)
METHGB BLD QL: 0.2 % (ref 0–3)
MODALITY: ABNORMAL
MONOCYTES # BLD AUTO: 0.34 10*3/MM3 (ref 0.1–0.9)
MONOCYTES NFR BLD AUTO: 2 % (ref 5–12)
MRSA DNA SPEC QL NAA+PROBE: NORMAL
NEUTROPHILS NFR BLD AUTO: 15.24 10*3/MM3 (ref 1.7–7)
NEUTROPHILS NFR BLD AUTO: 90.5 % (ref 42.7–76)
NRBC BLD AUTO-RTO: 0 /100 WBC (ref 0–0.2)
OXYHGB MFR BLDV: 93.5 % (ref 94–99)
PCO2 BLDA: 50.3 MM HG (ref 35–45)
PCO2 TEMP ADJ BLD: ABNORMAL MM[HG]
PH BLDA: 7.31 PH UNITS (ref 7.35–7.45)
PH, TEMP CORRECTED: ABNORMAL
PLATELET # BLD AUTO: 201 10*3/MM3 (ref 140–450)
PMV BLD AUTO: 9.2 FL (ref 6–12)
PO2 BLDA: 75.6 MM HG (ref 83–108)
PO2 TEMP ADJ BLD: ABNORMAL MM[HG]
POTASSIUM SERPL-SCNC: 5.4 MMOL/L (ref 3.5–5.2)
PROT SERPL-MCNC: 6.3 G/DL (ref 6–8.5)
QT INTERVAL: 328 MS
QT INTERVAL: 344 MS
QTC INTERVAL: 396 MS
QTC INTERVAL: 409 MS
RBC # BLD AUTO: 5.39 10*6/MM3 (ref 4.14–5.8)
SAO2 % BLDCOA: 95.6 % (ref 94–99)
SODIUM SERPL-SCNC: 137 MMOL/L (ref 136–145)
SODIUM UR-SCNC: 103 MMOL/L
VENTILATOR MODE: ABNORMAL
WBC NRBC COR # BLD AUTO: 16.86 10*3/MM3 (ref 3.4–10.8)

## 2024-04-18 PROCEDURE — 82805 BLOOD GASES W/O2 SATURATION: CPT

## 2024-04-18 PROCEDURE — 94660 CPAP INITIATION&MGMT: CPT

## 2024-04-18 PROCEDURE — 94799 UNLISTED PULMONARY SVC/PX: CPT

## 2024-04-18 PROCEDURE — 99233 SBSQ HOSP IP/OBS HIGH 50: CPT | Performed by: INTERNAL MEDICINE

## 2024-04-18 PROCEDURE — 25010000002 PIPERACILLIN SOD-TAZOBACTAM PER 1 G: Performed by: INTERNAL MEDICINE

## 2024-04-18 PROCEDURE — 94761 N-INVAS EAR/PLS OXIMETRY MLT: CPT

## 2024-04-18 PROCEDURE — 82948 REAGENT STRIP/BLOOD GLUCOSE: CPT

## 2024-04-18 PROCEDURE — 87641 MR-STAPH DNA AMP PROBE: CPT | Performed by: INTERNAL MEDICINE

## 2024-04-18 PROCEDURE — 25010000002 METHYLPREDNISOLONE PER 40 MG: Performed by: INTERNAL MEDICINE

## 2024-04-18 PROCEDURE — 94640 AIRWAY INHALATION TREATMENT: CPT

## 2024-04-18 PROCEDURE — 85025 COMPLETE CBC W/AUTO DIFF WBC: CPT | Performed by: INTERNAL MEDICINE

## 2024-04-18 PROCEDURE — 25810000003 SODIUM CHLORIDE 0.9 % SOLUTION: Performed by: INTERNAL MEDICINE

## 2024-04-18 PROCEDURE — 25010000002 ENOXAPARIN PER 10 MG: Performed by: INTERNAL MEDICINE

## 2024-04-18 PROCEDURE — 83050 HGB METHEMOGLOBIN QUAN: CPT

## 2024-04-18 PROCEDURE — 93005 ELECTROCARDIOGRAM TRACING: CPT | Performed by: INTERNAL MEDICINE

## 2024-04-18 PROCEDURE — 36600 WITHDRAWAL OF ARTERIAL BLOOD: CPT

## 2024-04-18 PROCEDURE — 82375 ASSAY CARBOXYHB QUANT: CPT

## 2024-04-18 PROCEDURE — 80053 COMPREHEN METABOLIC PANEL: CPT | Performed by: INTERNAL MEDICINE

## 2024-04-18 PROCEDURE — 93010 ELECTROCARDIOGRAM REPORT: CPT | Performed by: INTERNAL MEDICINE

## 2024-04-18 PROCEDURE — 83735 ASSAY OF MAGNESIUM: CPT | Performed by: INTERNAL MEDICINE

## 2024-04-18 PROCEDURE — 94664 DEMO&/EVAL PT USE INHALER: CPT

## 2024-04-18 PROCEDURE — 86738 MYCOPLASMA ANTIBODY: CPT | Performed by: INTERNAL MEDICINE

## 2024-04-18 RX ORDER — LISINOPRIL 40 MG/1
40 TABLET ORAL DAILY
Status: ON HOLD | COMMUNITY
End: 2024-04-19

## 2024-04-18 RX ORDER — GABAPENTIN 300 MG/1
600 CAPSULE ORAL
COMMUNITY

## 2024-04-18 RX ORDER — MELOXICAM 15 MG/1
15 TABLET ORAL DAILY
Status: ON HOLD | COMMUNITY
End: 2024-04-19

## 2024-04-18 RX ORDER — MELOXICAM 7.5 MG/1
15 TABLET ORAL DAILY
Status: CANCELLED | OUTPATIENT
Start: 2024-04-18

## 2024-04-18 RX ORDER — ACETAMINOPHEN 325 MG/1
650 TABLET ORAL EVERY 6 HOURS PRN
Status: DISCONTINUED | OUTPATIENT
Start: 2024-04-18 | End: 2024-04-19 | Stop reason: HOSPADM

## 2024-04-18 RX ORDER — LISINOPRIL 10 MG/1
40 TABLET ORAL DAILY
Status: CANCELLED | OUTPATIENT
Start: 2024-04-18

## 2024-04-18 RX ORDER — FLUOXETINE 10 MG/1
10 CAPSULE ORAL DAILY
COMMUNITY
Start: 2024-03-26

## 2024-04-18 RX ORDER — FLUOXETINE 10 MG/1
10 CAPSULE ORAL DAILY
Status: DISCONTINUED | OUTPATIENT
Start: 2024-04-18 | End: 2024-04-19 | Stop reason: HOSPADM

## 2024-04-18 RX ORDER — GABAPENTIN 300 MG/1
300 CAPSULE ORAL 2 TIMES DAILY
Status: CANCELLED | OUTPATIENT
Start: 2024-04-18

## 2024-04-18 RX ORDER — GABAPENTIN 300 MG/1
300 CAPSULE ORAL 2 TIMES DAILY
COMMUNITY
Start: 2024-02-23

## 2024-04-18 RX ORDER — BUPRENORPHINE AND NALOXONE 8; 2 MG/1; MG/1
2 FILM, SOLUBLE BUCCAL; SUBLINGUAL DAILY
COMMUNITY
Start: 2024-04-09 | End: 2024-04-23

## 2024-04-18 RX ORDER — BUPRENORPHINE HYDROCHLORIDE AND NALOXONE HYDROCHLORIDE DIHYDRATE 8; 2 MG/1; MG/1
2 TABLET SUBLINGUAL DAILY
Status: DISCONTINUED | OUTPATIENT
Start: 2024-04-18 | End: 2024-04-19 | Stop reason: HOSPADM

## 2024-04-18 RX ORDER — GABAPENTIN 300 MG/1
600 CAPSULE ORAL NIGHTLY
Status: CANCELLED | OUTPATIENT
Start: 2024-04-18

## 2024-04-18 RX ADMIN — SODIUM CHLORIDE 1000 ML: 9 INJECTION, SOLUTION INTRAVENOUS at 10:22

## 2024-04-18 RX ADMIN — PIPERACILLIN SODIUM AND TAZOBACTAM SODIUM 3.38 G: 3; .375 INJECTION, POWDER, LYOPHILIZED, FOR SOLUTION INTRAVENOUS at 01:51

## 2024-04-18 RX ADMIN — IPRATROPIUM BROMIDE AND ALBUTEROL SULFATE 3 ML: 2.5; .5 SOLUTION RESPIRATORY (INHALATION) at 07:33

## 2024-04-18 RX ADMIN — Medication 10 ML: at 08:22

## 2024-04-18 RX ADMIN — METHYLPREDNISOLONE SODIUM SUCCINATE 40 MG: 40 INJECTION, POWDER, FOR SOLUTION INTRAMUSCULAR; INTRAVENOUS at 08:20

## 2024-04-18 RX ADMIN — IPRATROPIUM BROMIDE AND ALBUTEROL SULFATE 3 ML: 2.5; .5 SOLUTION RESPIRATORY (INHALATION) at 13:12

## 2024-04-18 RX ADMIN — Medication 10 ML: at 20:40

## 2024-04-18 RX ADMIN — ENOXAPARIN SODIUM 40 MG: 40 INJECTION SUBCUTANEOUS at 08:21

## 2024-04-18 RX ADMIN — PIPERACILLIN SODIUM AND TAZOBACTAM SODIUM 3.38 G: 3; .375 INJECTION, POWDER, LYOPHILIZED, FOR SOLUTION INTRAVENOUS at 17:07

## 2024-04-18 RX ADMIN — FLUOXETINE HYDROCHLORIDE 10 MG: 10 CAPSULE ORAL at 12:11

## 2024-04-18 RX ADMIN — SODIUM CHLORIDE 1000 ML: 9 INJECTION, SOLUTION INTRAVENOUS at 08:19

## 2024-04-18 RX ADMIN — SODIUM CHLORIDE 125 ML/HR: 9 INJECTION, SOLUTION INTRAVENOUS at 17:11

## 2024-04-18 RX ADMIN — BUPRENORPHINE HYDROCHLORIDE AND NALOXONE HYDROCHLORIDE DIHYDRATE 2 TABLET: 8; 2 TABLET SUBLINGUAL at 11:28

## 2024-04-18 RX ADMIN — PIPERACILLIN SODIUM AND TAZOBACTAM SODIUM 3.38 G: 3; .375 INJECTION, POWDER, LYOPHILIZED, FOR SOLUTION INTRAVENOUS at 10:22

## 2024-04-18 RX ADMIN — IPRATROPIUM BROMIDE AND ALBUTEROL SULFATE 3 ML: 2.5; .5 SOLUTION RESPIRATORY (INHALATION) at 00:13

## 2024-04-18 RX ADMIN — IPRATROPIUM BROMIDE AND ALBUTEROL SULFATE 3 ML: 2.5; .5 SOLUTION RESPIRATORY (INHALATION) at 18:44

## 2024-04-18 RX ADMIN — METHYLPREDNISOLONE SODIUM SUCCINATE 40 MG: 40 INJECTION, POWDER, FOR SOLUTION INTRAMUSCULAR; INTRAVENOUS at 20:40

## 2024-04-18 NOTE — PLAN OF CARE
Goal Outcome Evaluation:  Plan of Care Reviewed With: patient        Progress: improving  Outcome Evaluation: Pt A&Ox4 with father at bedside. Orginally on bipap and transitioned to 2L NC. VSS. NS fluids going at 75 mL/hr. No new needs noted at this time.         Problem: Noninvasive Ventilation Acute  Goal: Effective Unassisted Ventilation and Oxygenation  Outcome: Ongoing, Progressing     Problem: Hypertension Comorbidity  Goal: Blood Pressure in Desired Range  Outcome: Ongoing, Progressing     Problem: Obstructive Sleep Apnea Risk or Actual Comorbidity Management  Goal: Unobstructed Breathing During Sleep  Outcome: Ongoing, Progressing     Problem: Adult Inpatient Plan of Care  Goal: Plan of Care Review  Outcome: Ongoing, Progressing  Flowsheets (Taken 4/18/2024 0514)  Progress: improving  Plan of Care Reviewed With: patient  Outcome Evaluation: Pt A&Ox4 with father at bedside. Orginally on bipap and transitioned to 2L NC. VSS. NS fluids going at 75 mL/hr. No new needs noted at this time.  Goal: Patient-Specific Goal (Individualized)  Outcome: Ongoing, Progressing  Goal: Absence of Hospital-Acquired Illness or Injury  Outcome: Ongoing, Progressing  Intervention: Identify and Manage Fall Risk  Recent Flowsheet Documentation  Taken 4/18/2024 0500 by Caroline Morton, RN  Safety Promotion/Fall Prevention:   safety round/check completed   room organization consistent   nonskid shoes/slippers when out of bed   lighting adjusted   fall prevention program maintained   clutter free environment maintained   assistive device/personal items within reach   activity supervised  Taken 4/18/2024 0300 by Caroline Morton, RN  Safety Promotion/Fall Prevention:   safety round/check completed   room organization consistent   nonskid shoes/slippers when out of bed   lighting adjusted   fall prevention program maintained   clutter free environment maintained   assistive device/personal items within reach   activity supervised  Taken  4/18/2024 0100 by Caroline Morton RN  Safety Promotion/Fall Prevention:   safety round/check completed   room organization consistent   nonskid shoes/slippers when out of bed   lighting adjusted   fall prevention program maintained   clutter free environment maintained   assistive device/personal items within reach   activity supervised  Taken 4/17/2024 2300 by Caroline Morton RN  Safety Promotion/Fall Prevention:   safety round/check completed   room organization consistent   nonskid shoes/slippers when out of bed   lighting adjusted   fall prevention program maintained   clutter free environment maintained   assistive device/personal items within reach   activity supervised  Taken 4/17/2024 2100 by Caroline Morton RN  Safety Promotion/Fall Prevention:   safety round/check completed   room organization consistent   nonskid shoes/slippers when out of bed   lighting adjusted   fall prevention program maintained   clutter free environment maintained   assistive device/personal items within reach   activity supervised  Taken 4/17/2024 1900 by Caroline Morton RN  Safety Promotion/Fall Prevention:   safety round/check completed   room organization consistent   nonskid shoes/slippers when out of bed   lighting adjusted   fall prevention program maintained   clutter free environment maintained   assistive device/personal items within reach   activity supervised  Intervention: Prevent Skin Injury  Recent Flowsheet Documentation  Taken 4/17/2024 2022 by Caroline Morton RN  Skin Protection:   adhesive use limited   pulse oximeter probe site changed   tubing/devices free from skin contact   transparent dressing maintained  Intervention: Prevent and Manage VTE (Venous Thromboembolism) Risk  Recent Flowsheet Documentation  Taken 4/18/2024 0200 by Caroline Morton RN  VTE Prevention/Management: (see MAR) other (see comments)  Range of Motion: active ROM (range of motion) encouraged  Taken 4/17/2024 2022 by Praveen  JOSIE Velazquez  VTE Prevention/Management: (see MAR) other (see comments)  Range of Motion: active ROM (range of motion) encouraged  Intervention: Prevent Infection  Recent Flowsheet Documentation  Taken 4/18/2024 0500 by Caroline Morton RN  Infection Prevention:   hand hygiene promoted   rest/sleep promoted   single patient room provided  Taken 4/18/2024 0300 by Caroline Morton RN  Infection Prevention:   hand hygiene promoted   rest/sleep promoted   single patient room provided  Taken 4/18/2024 0100 by Caroline Morton RN  Infection Prevention:   hand hygiene promoted   rest/sleep promoted   single patient room provided  Taken 4/17/2024 2300 by Caroline Morton RN  Infection Prevention:   hand hygiene promoted   rest/sleep promoted   single patient room provided  Taken 4/17/2024 2100 by Caroline Morton RN  Infection Prevention:   hand hygiene promoted   rest/sleep promoted   single patient room provided  Taken 4/17/2024 1900 by Caroline Morton RN  Infection Prevention:   hand hygiene promoted   rest/sleep promoted   single patient room provided  Goal: Optimal Comfort and Wellbeing  Outcome: Ongoing, Progressing  Intervention: Provide Person-Centered Care  Recent Flowsheet Documentation  Taken 4/18/2024 0200 by Caroline Morton RN  Trust Relationship/Rapport:   care explained   choices provided   thoughts/feelings acknowledged   reassurance provided  Taken 4/17/2024 2022 by Caroline Morton RN  Trust Relationship/Rapport:   care explained   choices provided   thoughts/feelings acknowledged   reassurance provided  Goal: Readiness for Transition of Care  Outcome: Ongoing, Progressing     Problem: Fall Injury Risk  Goal: Absence of Fall and Fall-Related Injury  Outcome: Ongoing, Progressing  Intervention: Promote Injury-Free Environment  Recent Flowsheet Documentation  Taken 4/18/2024 0500 by Caroline Morton RN  Safety Promotion/Fall Prevention:   safety round/check completed   room organization  consistent   nonskid shoes/slippers when out of bed   lighting adjusted   fall prevention program maintained   clutter free environment maintained   assistive device/personal items within reach   activity supervised  Taken 4/18/2024 0300 by Caroline Morton RN  Safety Promotion/Fall Prevention:   safety round/check completed   room organization consistent   nonskid shoes/slippers when out of bed   lighting adjusted   fall prevention program maintained   clutter free environment maintained   assistive device/personal items within reach   activity supervised  Taken 4/18/2024 0100 by Caroline Morton RN  Safety Promotion/Fall Prevention:   safety round/check completed   room organization consistent   nonskid shoes/slippers when out of bed   lighting adjusted   fall prevention program maintained   clutter free environment maintained   assistive device/personal items within reach   activity supervised  Taken 4/17/2024 2300 by Caroline Morton RN  Safety Promotion/Fall Prevention:   safety round/check completed   room organization consistent   nonskid shoes/slippers when out of bed   lighting adjusted   fall prevention program maintained   clutter free environment maintained   assistive device/personal items within reach   activity supervised  Taken 4/17/2024 2100 by Caroline Morton RN  Safety Promotion/Fall Prevention:   safety round/check completed   room organization consistent   nonskid shoes/slippers when out of bed   lighting adjusted   fall prevention program maintained   clutter free environment maintained   assistive device/personal items within reach   activity supervised  Taken 4/17/2024 1900 by Caroline Morton RN  Safety Promotion/Fall Prevention:   safety round/check completed   room organization consistent   nonskid shoes/slippers when out of bed   lighting adjusted   fall prevention program maintained   clutter free environment maintained   assistive device/personal items within reach   activity  supervised     Problem: Obstructive Sleep Apnea Risk or Actual  Goal: Unobstructed Breathing During Sleep  Outcome: Ongoing, Progressing

## 2024-04-18 NOTE — PLAN OF CARE
Goal Outcome Evaluation:  Plan of Care Reviewed With: patient, family           Outcome Evaluation: A/O times 4. VSS. RA. SR. IVF infusing at 125mL/hr. No needs noted at this time. Bed alarm set, call light within reach. Family remains at bedside.

## 2024-04-18 NOTE — PAYOR COMM NOTE
"[facility]  NPI:[id number]    Utilization Review  Contact: Marie Kaiser RN  Phone: [phone]  Fax:[phone]    INITIATE INPATIENT AUTHORIZATION    Stoney Mcintosh (44 y.o. Male)       Date of Birth   1980    Social Security Number       Address   [address] [place] [address]    Home Phone   [phone]    MRN   [id number]       Caodaism   None    Marital Status                               Admission Date   24    Admission Type   Emergency    Admitting Provider   Radha Benoit DO    Attending Provider   Radha Benoit DO    Department, Room/Bed   [facility] PROGRESS CARE, P212/S2       Discharge Date       Discharge Disposition       Discharge Destination                                 Attending Provider: Radha Benoit DO    Allergies: Tetracycline    Isolation: None   Infection: None   Code Status: CPR    Ht: 193 cm (76\")   Wt: 137 kg (302 lb 0.5 oz)    Admission Cmt: None   Principal Problem: Respiratory failure [J96.90]                   Active Insurance as of 2024       Primary Coverage       Payor Plan Insurance Group Employer/Plan Group    MISC COMMERCIAL [facility]        Coverage Address Coverage Phone Number Coverage Fax Number Effective Dates    [address] [phone]  2024 - None Entered    [facility] [id number]         Subscriber Name Subscriber Birth Date Member ID       STONEY MCINTOSH 1980 [id number]                     Emergency Contacts        (Rel.) Home Phone Work Phone Mobile Phone    JAYLIN BREWER (Spouse) [phone] -- --    DonSheron (Mother) -- -- [phone]    DonAbdiel shepherd (Father) -- -- [phone]                 History & Physical        Radha Benoit DO at 24 1840          [facility]   HISTORY AND PHYSICAL    Patient Name: Stoney Mcintosh  : 1980  MRN: [id number]  Primary Care Physician:  Provider, No Known  Date of admission: " 4/17/2024    Subjective  Subjective     Chief Complaint: Somnolence    History of Present Illness the patient is a 44-year-old male with past medical history significant for opiate dependence, depression and obstructive sleep apnea who presents to the emergency department with altered mental status and somnolence.    Patient seen and examined in . His parents are present at bedside.    According to my discussion with ED provider and per chart review, the patient has been using Xanax as illicitly.  Family concerned that patient had taken too much Xanax as today.  Patient also reportedly took 600 mg of Neurontin and Suboxone for which he is prescribed.    On my exam, the patient is very somnolent and unable to provide any history of presenting illness.  He is using his abdominal/secondary muscles of respiration. Parents are unsure of any other presenting symptoms.    Review of Systems   Unable to perform ROS: Mental status change      Personal History     Past Medical History:   Diagnosis Date    Depression     Opiate dependence     KANDICE (obstructive sleep apnea)        History reviewed. No pertinent surgical history.    Family History: family history is not on file.     Social History:  Occasional Alcohol use    Home Medications:   Awaiting pharmacy completion of home medications    Allergies:  No Known Allergies    Objective   Objective     Vitals:   Temp:  [97.9 °F (36.6 °C)] 97.9 °F (36.6 °C)  Heart Rate:  [] 96  Resp:  [17-22] 17  BP: (105-166)/(51-94) 120/66    Physical Exam  Constitutional:       General: He is not in acute distress.     Appearance: He is well-developed. He is obese. He is ill-appearing and diaphoretic.      Interventions: Nasal cannula in place.   HENT:      Head: Normocephalic and atraumatic.   Eyes:      Conjunctiva/sclera: Conjunctivae normal.   Neck:      Trachea: No tracheal deviation.   Cardiovascular:      Rate and Rhythm: Normal rate and regular rhythm.      Pulses:            Dorsalis pedis pulses are 2+ on the right side and 2+ on the left side.      Heart sounds: No murmur heard.     No friction rub. No gallop.      Comments: No significant lower extremity edema  Pulmonary:      Effort: Tachypnea and accessory muscle usage present. No respiratory distress.      Breath sounds: Decreased breath sounds and rhonchi present. No wheezing or rales.   Abdominal:      General: Abdomen is protuberant. Bowel sounds are normal. Bowel sounds are decreased. There is no distension.      Palpations: Abdomen is soft.      Tenderness: There is no abdominal tenderness. There is no guarding.   Skin:     General: Skin is warm and dry.      Findings: No erythema or rash.   Neurological:      Mental Status: He is lethargic.      Comments: Will briefly awaken to verbal and tactile stimuli but quickly falls back to sleep         Result Review   Result Review:  I have personally reviewed the results from the time of this admission to 4/17/2024 19:00 EDT and agree with these findings:  [x]  Laboratory list / accordion  []  Microbiology  [x]  Radiology  [x]  EKG/Telemetry   []  Cardiology/Vascular   []  Pathology  []  Old records  []  Other:  Most notable findings include:     EKG: NSR with non-specific ST-T changes    Results from last 7 days   Lab Units 04/17/24  1408   WBC 10*3/mm3 11.32*   HEMOGLOBIN g/dL 17.0   HEMATOCRIT % 51.7*   PLATELETS 10*3/mm3 190     Results from last 7 days   Lab Units 04/17/24  1408   SODIUM mmol/L 141   POTASSIUM mmol/L 4.2   CHLORIDE mmol/L 103   CO2 mmol/L 28.1   BUN mg/dL 10   CREATININE mg/dL 1.39*   CALCIUM mg/dL 9.2   BILIRUBIN mg/dL 0.4   ALK PHOS U/L 102   ALT (SGPT) U/L 30   AST (SGOT) U/L 36   GLUCOSE mg/dL 107*     Last Urine Toxicity          Latest Ref Rng & Units 4/17/2024   LAST URINE TOXICITY RESULTS   Amphetamine, Urine Qual Negative Negative    Barbiturates Screen, Urine Negative Negative    Benzodiazepine Screen, Urine Negative Positive    Buprenorphine,  Screen, Urine Negative Positive    Cocaine Screen, Urine Negative Negative    Fentanyl, Urine Negative Negative    Methadone Screen , Urine Negative Negative    Methamphetamine, Ur Negative Negative      CT head without contrast:  FINDINGS: There is no CT evidence of hemorrhage. There is no mass, mass  effect or midline shift. Note is made of a dilated perivascular space in  the right basal ganglia. There is no hydrocephalus. The paranasal  sinuses are clear. Bone windows reveal no acute osseous abnormalities.     IMPRESSION:  No acute intracranial process.    CT chest with PE protocol (images reviewed):  FINDINGS: There are no filling defects within the pulmonary arteries to  suggest an embolus. The thoracic aorta is normal in caliber with no  evidence of dissection. The heart is normal in size. There are no  pleural or pericardial effusions. There is no adenopathy. The thyroid  gland is unremarkable. Lung windows reveal patchy airspace disease in  the right greater than left lung bases consistent with pneumonia. The  visualized upper abdomen is unremarkable. Bone windows reveal no acute  osseous abnormalities.     IMPRESSION:  1. No evidence of pulmonary embolus or aortic dissection.  2. Patchy basilar airspace disease consistent with pneumonia.    Assessment / Plan     #Sepsis due to bilateral aspiration pneumonia  #Suspected unintentional benzodiazepine overdose  #Chronic Suboxone use/Chronic opiate dependence  #Acute hypoxemic respiratory failure  #Obstructive sleep apnea  #Mild troponin T elevation, suspect due to respiratory failure and pneumonia  #Obesity with BMI 35.30 kg/m²  #Mild hypomagnesemia  -Patient will be admitted to the progressive care unit for further evaluation and management with fall and aspiration precautions  -I have requested that respiratory therapy place patient on CPAP at this time and it is to be worn as needed and at bedtime  -I have requested a nurse dysphagia screen as patient  currently appears too lethargic for safe oral intake  -Patient will be started on scheduled nebulized inhalants and IV Solu-Medrol  -I started the patient on Zosyn monotherapy  -Will trend patient's EKG given nonspecific ST-T changes  -Repeat cbc and chemistry panel in a.m.   -Patient will receive 4 g magnesium sulfate IV x 1  -Repeat magnesium level in a.m.  -Patient will be started on IV fluid hydration with NS @75 ml/hr    DVT prophylaxis:  Lovenox    CODE STATUS:    Code Status (Patient has no pulse and is not breathing): CPR (Attempt to Resuscitate)  Medical Interventions (Patient has pulse or is breathing): Full Support    Admission Status:  I believe this patient meets inpatient status.    Patient is considered to be high risk patient due to: Acute respiratory failure/respiratory distress, unintentional benzodiazepine overdose and aspiration pneumonia    Radha Benoit DO    Electronically signed by Radha Benoit DO at 04/17/24 1917          Emergency Department Notes        Luana Guzmán DO at 04/17/24 1349          Subjective   History of Present Illness  44-year-old male presents secondary to altered mental status with low heart rate and low oxygen level.  Patient's significant other brought him in by private vehicle.  She provides all of his history.  Patient is awake alert though confused.  Patient's baseline status is a normal historian with a past medical history of obstructive sleep apnea, hypertension, chronic low back pain.  Patient apparently does abuse Xanax.  He does take Neurontin 600 mg along with Suboxone according to his significant other.  Patient is snorting the Xanax.  She is concerned that he might have taken too much Xanax.  Patient presented to her residence around 8.  He was confused.  He progressively got worse.  She felt that this could have been due to oversedation and his CO2 level rising where he was not on his CPAP.  She was going to take him to get his CPAP  however he progressively got worse so she brought him to the emergency department.  Patient is on testosterone and on human growth hormone.  He had not been sick recently.  He does have an abrasion on his right forehead.  He states he struck a cabinet with his head.      Review of Systems   Constitutional: Negative.  Negative for fever.   HENT: Negative.     Respiratory: Negative.     Cardiovascular: Negative.  Negative for chest pain.   Gastrointestinal: Negative.  Negative for abdominal pain.   Endocrine: Negative.    Genitourinary: Negative.  Negative for dysuria.   Skin: Negative.    Neurological: Negative.    Psychiatric/Behavioral: Negative.     All other systems reviewed and are negative.      Past Medical History:   Diagnosis Date    Depression     Opiate dependence     KANDICE (obstructive sleep apnea)        No Known Allergies    History reviewed. No pertinent surgical history.    History reviewed. No pertinent family history.    Social History     Socioeconomic History    Marital status:    Tobacco Use    Smoking status: Never    Smokeless tobacco: Current   Vaping Use    Vaping status: Never Used   Substance and Sexual Activity    Alcohol use: Not Currently    Drug use: Yes     Types: Benzodiazepines           Objective   Physical Exam  Vitals and nursing note reviewed.   Constitutional:       General: He is in acute distress.      Appearance: He is well-developed. He is ill-appearing, toxic-appearing and diaphoretic.   HENT:      Head: Normocephalic and atraumatic.      Right Ear: External ear normal.      Left Ear: External ear normal.      Nose: Nose normal.   Eyes:      Conjunctiva/sclera: Conjunctivae normal.      Pupils: Pupils are equal, round, and reactive to light.   Neck:      Vascular: No JVD.      Trachea: No tracheal deviation.   Cardiovascular:      Rate and Rhythm: Normal rate and regular rhythm.      Heart sounds: Normal heart sounds. No murmur heard.  Pulmonary:      Effort: Pulmonary  effort is normal. No respiratory distress.      Breath sounds: Normal breath sounds. No wheezing.   Abdominal:      General: Bowel sounds are normal.      Palpations: Abdomen is soft.      Tenderness: There is no abdominal tenderness.   Musculoskeletal:         General: No deformity. Normal range of motion.      Cervical back: Normal range of motion and neck supple.   Skin:     General: Skin is warm and dry.      Coloration: Skin is not pale.      Findings: No erythema or rash.      Comments: Very red-faced.   Neurological:      Mental Status: He is alert.      Cranial Nerves: No cranial nerve deficit.      Comments: Awake alert though confused.   Psychiatric:         Behavior: Behavior normal.         Thought Content: Thought content normal.         Procedures      Results for orders placed or performed during the hospital encounter of 04/17/24   COVID-19, FLU A/B, RSV PCR 1 HR TAT - Swab, Nasopharynx    Specimen: Nasopharynx; Swab   Result Value Ref Range    COVID19 Not Detected Not Detected - Ref. Range    Influenza A PCR Not Detected Not Detected    Influenza B PCR Not Detected Not Detected    RSV, PCR Not Detected Not Detected   Comprehensive Metabolic Panel    Specimen: Blood   Result Value Ref Range    Glucose 107 (H) 65 - 99 mg/dL    BUN 10 6 - 20 mg/dL    Creatinine 1.39 (H) 0.76 - 1.27 mg/dL    Sodium 141 136 - 145 mmol/L    Potassium 4.2 3.5 - 5.2 mmol/L    Chloride 103 98 - 107 mmol/L    CO2 28.1 22.0 - 29.0 mmol/L    Calcium 9.2 8.6 - 10.5 mg/dL    Total Protein 6.4 6.0 - 8.5 g/dL    Albumin 4.1 3.5 - 5.2 g/dL    ALT (SGPT) 30 1 - 41 U/L    AST (SGOT) 36 1 - 40 U/L    Alkaline Phosphatase 102 39 - 117 U/L    Total Bilirubin 0.4 0.0 - 1.2 mg/dL    Globulin 2.3 gm/dL    A/G Ratio 1.8 g/dL    BUN/Creatinine Ratio 7.2 7.0 - 25.0    Anion Gap 9.9 5.0 - 15.0 mmol/L    eGFR 63.3 >60.0 mL/min/1.73   Protime-INR    Specimen: Blood   Result Value Ref Range    Protime 13.5 12.1 - 14.7 Seconds    INR 0.98 0.90 -  1.10   aPTT    Specimen: Blood   Result Value Ref Range    PTT 27.0 26.5 - 34.5 seconds   Lactic Acid, Plasma    Specimen: Blood   Result Value Ref Range    Lactate 1.7 0.5 - 2.0 mmol/L   Procalcitonin    Specimen: Blood   Result Value Ref Range    Procalcitonin 0.08 0.00 - 0.25 ng/mL   High Sensitivity Troponin T    Specimen: Blood   Result Value Ref Range    HS Troponin T 13 <22 ng/L   C-reactive Protein    Specimen: Blood   Result Value Ref Range    C-Reactive Protein 0.59 (H) 0.00 - 0.50 mg/dL   Urinalysis With Culture If Indicated - Urine, Clean Catch    Specimen: Urine, Clean Catch   Result Value Ref Range    Color, UA Dark Yellow (A) Yellow, Straw    Appearance, UA Clear Clear    pH, UA 5.5 5.0 - 8.0    Specific Gravity, UA 1.024 1.005 - 1.030    Glucose, UA Negative Negative    Ketones, UA Negative Negative    Bilirubin, UA Small (1+) (A) Negative    Blood, UA Negative Negative    Protein, UA Negative Negative    Leuk Esterase, UA Negative Negative    Nitrite, UA Negative Negative    Urobilinogen, UA 1.0 E.U./dL 0.2 - 1.0 E.U./dL   Ethanol    Specimen: Blood   Result Value Ref Range    Ethanol <10 0 - 10 mg/dL    Ethanol % <0.010 %   Urine Drug Screen - Urine, Clean Catch    Specimen: Urine, Clean Catch   Result Value Ref Range    THC, Screen, Urine Negative Negative    Phencyclidine (PCP), Urine Negative Negative    Cocaine Screen, Urine Negative Negative    Methamphetamine, Ur Negative Negative    Opiate Screen Negative Negative    Amphetamine Screen, Urine Negative Negative    Benzodiazepine Screen, Urine Positive (A) Negative    Tricyclic Antidepressants Screen Negative Negative    Methadone Screen, Urine Negative Negative    Barbiturates Screen, Urine Negative Negative    Oxycodone Screen, Urine Negative Negative    Buprenorphine, Screen, Urine Positive (A) Negative   Magnesium    Specimen: Blood   Result Value Ref Range    Magnesium 1.5 (L) 1.6 - 2.6 mg/dL   TSH    Specimen: Blood   Result Value Ref  Range    TSH 1.930 0.270 - 4.200 uIU/mL   CBC Auto Differential    Specimen: Blood   Result Value Ref Range    WBC 11.32 (H) 3.40 - 10.80 10*3/mm3    RBC 5.86 (H) 4.14 - 5.80 10*6/mm3    Hemoglobin 17.0 13.0 - 17.7 g/dL    Hematocrit 51.7 (H) 37.5 - 51.0 %    MCV 88.2 79.0 - 97.0 fL    MCH 29.0 26.6 - 33.0 pg    MCHC 32.9 31.5 - 35.7 g/dL    RDW 14.0 12.3 - 15.4 %    RDW-SD 45.1 37.0 - 54.0 fl    MPV 9.3 6.0 - 12.0 fL    Platelets 190 140 - 450 10*3/mm3    Neutrophil % 78.5 (H) 42.7 - 76.0 %    Lymphocyte % 11.6 (L) 19.6 - 45.3 %    Monocyte % 6.6 5.0 - 12.0 %    Eosinophil % 2.6 0.3 - 6.2 %    Basophil % 0.4 0.0 - 1.5 %    Immature Grans % 0.3 0.0 - 0.5 %    Neutrophils, Absolute 8.89 (H) 1.70 - 7.00 10*3/mm3    Lymphocytes, Absolute 1.31 0.70 - 3.10 10*3/mm3    Monocytes, Absolute 0.75 0.10 - 0.90 10*3/mm3    Eosinophils, Absolute 0.29 0.00 - 0.40 10*3/mm3    Basophils, Absolute 0.05 0.00 - 0.20 10*3/mm3    Immature Grans, Absolute 0.03 0.00 - 0.05 10*3/mm3    nRBC 0.0 0.0 - 0.2 /100 WBC   Blood Gas, Arterial With Co-Ox    Specimen: Arterial Blood   Result Value Ref Range    Site Left Brachial     Bharath's Test N/A     pH, Arterial 7.411 7.350 - 7.450 pH units    pCO2, Arterial 44.2 35.0 - 45.0 mm Hg    pO2, Arterial 52.2 (C) 83.0 - 108.0 mm Hg    HCO3, Arterial 28.0 (H) 20.0 - 26.0 mmol/L    Base Excess, Arterial 2.7 (H) 0.0 - 2.0 mmol/L    O2 Saturation, Arterial 89.4 (L) 94.0 - 99.0 %    Hemoglobin, Blood Gas 17.6 14 - 18 g/dL    Hematocrit, Blood Gas 54.0 (H) 38.0 - 51.0 %    Oxyhemoglobin 88.2 (L) 94 - 99 %    Methemoglobin 0.00 0.00 - 3.00 %    Carboxyhemoglobin 1.3 0 - 5 %    A-a DO2 40.8 0.0 - 300.0 mmHg    CO2 Content 29.4 22 - 33 mmol/L    Barometric Pressure for Blood Gas 727 mmHg    Modality Room Air     FIO2 21 %    Ventilator Mode NA     Notified Who MEGAN CHAPIN AND BRITTANY RN     Notified By [id number]     Notified Time 04/17/2024 13:57     Collected by [id number]     pH, Temp Corrected      pCO2, Temperature  Corrected      pO2, Temperature Corrected     Fentanyl, Urine - Urine, Clean Catch    Specimen: Urine, Clean Catch   Result Value Ref Range    Fentanyl, Urine Negative Negative   High Sensitivity Troponin T 2Hr    Specimen: Blood   Result Value Ref Range    HS Troponin T 25 (H) <22 ng/L    Troponin T Delta 12 (C) >=-4 - <+4 ng/L   POC Glucose Once    Specimen: Blood   Result Value Ref Range    Glucose 95 70 - 130 mg/dL   ECG 12 Lead Other; Sepsis   Result Value Ref Range    QT Interval 288 ms    QTC Interval 403 ms   ECG 12 Lead Altered Mental Status   Result Value Ref Range    QT Interval 328 ms    QTC Interval 396 ms   Green Top (Gel)   Result Value Ref Range    Extra Tube Hold for add-ons.    Lavender Top   Result Value Ref Range    Extra Tube hold for add-on    Gold Top - SST   Result Value Ref Range    Extra Tube Hold for add-ons.    Light Blue Top   Result Value Ref Range    Extra Tube Hold for add-ons.        CT Angiogram Chest Pulmonary Embolism   Final Result   1. No evidence of pulmonary embolus or aortic dissection.   2. Patchy basilar airspace disease consistent with pneumonia.                   This report was finalized on 4/17/2024 4:14 PM by Elysia Leger M.D..          XR Chest 1 View   Final Result   Low lung volumes with left basilar atelectasis or pneumonia.           This report was finalized on 4/17/2024 3:11 PM by Elysia Leger M.D..          CT Head Without Contrast   Final Result   No acute intracranial process.                   This report was finalized on 4/17/2024 2:55 PM by Elysia Leger M.D..              ED Course  ED Course as of 04/17/24 2014 Wed Apr 17, 2024   1433 ECG 12 Lead Other; Sepsis  Vent. Rate : 118 BPM     Atrial Rate : 118 BPM     P-R Int : 116 ms          QRS Dur :  88 ms      QT Int : 288 ms       P-R-T Axes :  54  49   5 degrees     QTc Int : 403 ms     Sinus tachycardia  Nonspecific T wave abnormality  Abnormal ECG  No previous ECGs available    [ES]   1747 Patient currently still remains on 2 L nasal cannula oxygen with saturations of 93%.  -CTA was negative for PE however there is evidence of what appears to be bibasilar pneumonia more prominent on the right lung base in the left  -Still remains somnolent likely polypharmacy induced given benzos and buprenorphine abuse.    -Currently patient's mother and father at bedside and are agreeable to admit pt   -Electronically signed by Luana Guzmán DO, 04/17/24, 5:48 PM EDT.   [LK]   1749 Cardiac enzyme was 13 with repeat at 25 likely demand driven given persistent tachycardia however patient now is down below 100 bpm, current hr 96bpm.  Electronically signed by Luana Guzmán DO, 04/17/24, 5:50 PM EDT.    Magnesium was also replaced with 1 g IVP  x1  [LK]      ED Course User Index  [ES] Kee Smith MD  [LK] Luana Guzmán DO                                     ELIZABETH reviewed by America Ca DO, Kee Smith MD, Luana Guzmán DO       Medical Decision Making  Problems Addressed:  Community acquired pneumonia, unspecified laterality: complicated acute illness or injury  Obstructive sleep apnea: complicated acute illness or injury  Polypharmacy: complicated acute illness or injury    Amount and/or Complexity of Data Reviewed  Labs: ordered.  Radiology: ordered.  ECG/medicine tests: ordered. Decision-making details documented in ED Course.    Risk  Prescription drug management.  Decision regarding hospitalization.        Final diagnoses:   Obstructive sleep apnea   Polypharmacy   Community acquired pneumonia, unspecified laterality       ED Disposition  ED Disposition       ED Disposition   Decision to Admit    Condition   --    Comment   Level of Care: Progressive Care [20]   Diagnosis: Respiratory failure [204498]   Admitting Physician: AMERICA CA [[id number]]   Certification: I Certify That Inpatient Hospital Services Are Medically Necessary For Greater Than 2  Midnights                 No follow-up provider specified.       Medication List      No changes were made to your prescriptions during this visit.            Luana Guzmán DO  04/17/24 2014      Electronically signed by Luana Guzmán DO at 04/17/24 2014       Vital Signs (last 2 days)       Date/Time Temp Temp src Pulse Resp BP Patient Position SpO2    04/18/24 0800 97.8 (36.6) Axillary -- -- -- -- --    04/18/24 0733 -- -- 82 16 -- -- 98    04/18/24 0700 -- -- 85 22 105/60 -- 96    04/18/24 0600 -- -- 90 23 90/62 -- 99    04/18/24 0500 -- -- 87 21 101/55 -- 95    04/18/24 0400 97.5 (36.4) Oral 82 14 98/48 -- 96    04/18/24 0356 -- -- 84 15 -- -- 95    04/18/24 0300 -- -- 85 18 105/56 -- 95    04/18/24 0200 -- -- 88 29 109/46 -- 94    04/18/24 0150 -- -- 85 14 -- -- 95    04/18/24 0100 -- -- 89 28 101/53 -- 98    04/18/24 0023 -- -- 87 14 -- -- 97    04/18/24 0013 -- -- 84 14 -- -- 97    04/18/24 0000 97.5 (36.4) Oral 85 27 105/65 -- 97    04/17/24 2300 -- -- 87 -- 95/77 -- 91    04/17/24 2200 -- -- 89 -- 78/33 -- 94    04/17/24 2142 -- -- 89 15 -- -- 95    04/17/24 2100 -- -- 87 -- 117/78 -- 97    04/17/24 2000 99.9 (37.7) Oral 90 18 89/74 -- 98    04/17/24 1939 -- -- 88 15 -- -- 96    04/17/24 1858 -- -- 94 17 -- -- 95    04/17/24 1834 -- -- 96 17 -- -- 94    04/17/24 1831 -- -- -- -- -- -- 96    04/17/24 1830 -- -- 95 -- 120/66 -- --    04/17/24 1815 -- -- 91 -- 137/56 -- --    04/17/24 1812 -- -- -- -- -- -- 94    04/17/24 1800 -- -- 94 -- 124/61 -- 93    04/17/24 1745 -- -- 100 -- 111/51 -- --    04/17/24 1744 -- -- -- -- -- -- 95    04/17/24 1716 -- -- -- -- -- -- 93    04/17/24 1715 -- -- 100 -- 146/70 -- --    04/17/24 1700 -- -- 98 -- 119/93 -- --    04/17/24 1658 -- -- -- -- -- -- 95    04/17/24 1631 -- -- 97 -- -- -- 95    04/17/24 1630 -- -- -- -- 166/71 -- --    04/17/24 1547 -- -- -- -- -- -- 94    04/17/24 1545 -- -- 105 -- 111/94 -- --    04/17/24 1530 -- -- 106 -- 121/82 -- --    04/17/24 1529 --  -- -- -- -- -- 94    04/17/24 1500 -- -- 117 -- 105/58 -- 95    04/17/24 1431 -- -- -- -- -- -- 94    04/17/24 1430 -- -- 115 -- 121/57 -- --    04/17/24 1413 -- -- -- -- -- -- 93    04/17/24 1412 -- -- 120 -- 123/74 -- --    04/17/24 1335 97.9 (36.6) Oral 125 22 151/68 Sitting 94          Facility-Administered Medications as of 4/18/2024   Medication Dose Route Frequency Provider Last Rate Last Admin    sennosides-docusate (PERICOLACE) 8.6-50 MG per tablet 2 tablet  2 tablet Oral BID Radha Benoit DO        And    polyethylene glycol (MIRALAX) packet 17 g  17 g Oral Daily PRN Radha Benoit DO        And    bisacodyl (DULCOLAX) EC tablet 5 mg  5 mg Oral Daily PRN Radha Benoit DO        And    bisacodyl (DULCOLAX) suppository 10 mg  10 mg Rectal Daily PRN Radha Benoit DO        [COMPLETED] cefTRIAXone (ROCEPHIN) 1,000 mg in sodium chloride 0.9 % 100 mL IVPB-VTB  1,000 mg Intravenous Once Luana Guzmán  mL/hr at 04/17/24 1829 1,000 mg at 04/17/24 1829    dextrose (D50W) (25 g/50 mL) IV injection 25 g  25 g Intravenous Q15 Min PRN Radha Benoit DO        dextrose (GLUTOSE) oral gel 15 g  15 g Oral Q15 Min PRN Radha Benoit DO        Enoxaparin Sodium (LOVENOX) syringe 40 mg  40 mg Subcutaneous Daily Radha Benoit DO   40 mg at 04/18/24 0821    glucagon HCl (Diagnostic) injection 1 mg  1 mg Subcutaneous Q15 Min PRN Radha Benoit DO        [COMPLETED] iopamidol (ISOVUE-370) 76 % injection 100 mL  100 mL Intravenous Once in imaging Luana Guzmán DO   85 mL at 04/17/24 1607    ipratropium-albuterol (DUO-NEB) nebulizer solution 3 mL  3 mL Nebulization 4x Daily - RT Radha Benoit DO   3 mL at 04/18/24 0733    [COMPLETED] magnesium sulfate 4g/100mL (PREMIX) infusion  4 g Intravenous Once Radha Benoit DO   4 g at 04/17/24 2022    [COMPLETED] magnesium sulfate in D5W 1g/100mL (PREMIX)  1 g Intravenous Once Luana Guzmán DO   Stopped at  04/17/24 1720    methylPREDNISolone sodium succinate (SOLU-Medrol) injection 40 mg  40 mg Intravenous Q12H Radha Benoit DO   40 mg at 04/18/24 0820    [COMPLETED] naloxone (NARCAN) injection 0.4 mg  0.4 mg Intravenous Once Magdy Dumont PA   0.4 mg at 04/17/24 1415    nitroglycerin (NITROSTAT) SL tablet 0.4 mg  0.4 mg Sublingual Q5 Min PRN Radha Benoit DO        [COMPLETED] piperacillin-tazobactam (ZOSYN) IVPB 3.375 g IVPB in 100 mL NS (VTB)  3.375 g Intravenous Once Radha Benoit DO   3.375 g at 04/17/24 2021    piperacillin-tazobactam (ZOSYN) IVPB 3.375 g IVPB in 100 mL NS (VTB)  3.375 g Intravenous Q8H Radha Benoit DO   3.375 g at 04/18/24 0151    [COMPLETED] sodium chloride 0.9 % bolus 1,000 mL  1,000 mL Intravenous Once Magdy Dumont PA   Stopped at 04/17/24 1520    [COMPLETED] sodium chloride 0.9 % bolus 1,000 mL  1,000 mL Intravenous Once Luana Guzmán DO   Stopped at 04/17/24 1720    sodium chloride 0.9 % bolus 1,000 mL  1,000 mL Intravenous Once Radha Benoit DO 1,000 mL/hr at 04/18/24 0819 1,000 mL at 04/18/24 0819    sodium chloride 0.9 % flush 10 mL  10 mL Intravenous PRN Radha Benoit DO        sodium chloride 0.9 % flush 10 mL  10 mL Intravenous Q12H Radha Benoit DO   10 mL at 04/18/24 0822    sodium chloride 0.9 % flush 10 mL  10 mL Intravenous PRN Radha Benoit DO        sodium chloride 0.9 % infusion 40 mL  40 mL Intravenous PRN Radha Benoit DO        sodium chloride 0.9 % infusion  125 mL/hr Intravenous Continuous Radha Benoit  mL/hr at 04/18/24 0818 125 mL/hr at 04/18/24 0818     Orders (all)        Start     Ordered    04/18/24 0830  sodium chloride 0.9 % bolus 1,000 mL  Once         04/18/24 0743    04/18/24 0800  Oral Care  2 Times Daily       04/17/24 1924    04/18/24 0743  Mycoplasma Pneumoniae Antibody, IgM - Blood,  Once         04/18/24 0742    04/18/24 0742  Respiratory Culture - Sputum,  "Cough  Once         04/18/24 0742    04/18/24 0742  MRSA Screen, PCR (Inpatient) - Swab, Nares  Once         04/18/24 0742    04/18/24 0742  Sodium, Urine, Random - Urine, Clean Catch  Once         04/18/24 0742    04/18/24 0742  Creatinine Urine Random (kidney function) GFR component - Urine, Clean Catch  Once         04/18/24 0742    04/18/24 0707  POC Glucose Once  PROCEDURE ONCE        Comments: Complete no more than 45 minutes prior to patient eating      04/18/24 0659    04/18/24 0621  POC Glucose Once  PROCEDURE ONCE        Comments: Complete no more than 45 minutes prior to patient eating      04/18/24 0144    04/18/24 0600  CBC & Differential  Morning Draw         04/17/24 1924    04/18/24 0600  Comprehensive Metabolic Panel  Morning Draw         04/17/24 1924    04/18/24 0600  Magnesium  Morning Draw         04/17/24 1924    04/18/24 0600  Blood Gas, Arterial -With Co-Ox Panel: Yes  Morning Draw         04/17/24 1924    04/18/24 0600  CBC Auto Differential  PROCEDURE ONCE         04/17/24 2202    04/18/24 0404  Blood Gas, Arterial With Co-Ox  PROCEDURE ONCE         04/18/24 0401    04/18/24 0200  piperacillin-tazobactam (ZOSYN) IVPB 3.375 g IVPB in 100 mL NS (VTB)  Every 8 Hours         04/17/24 1924    04/18/24 0000  POC Glucose Finger Q6H  Every 6 Hours      Comments: Complete no more than 45 minutes prior to patient eating      04/17/24 1924    04/17/24 2218  NIPPV-IPPV / BIPAP / CPAP  As Needed-RT         04/17/24 2218 04/17/24 2100  sodium chloride 0.9 % flush 10 mL  Every 12 Hours Scheduled         04/17/24 1924 04/17/24 2100  sennosides-docusate (PERICOLACE) 8.6-50 MG per tablet 2 tablet  2 Times Daily        Placed in \"And\" Linked Group    04/17/24 1924 04/17/24 2100  methylPREDNISolone sodium succinate (SOLU-Medrol) injection 40 mg  Every 12 Hours         04/17/24 1924 04/17/24 2015  Enoxaparin Sodium (LOVENOX) syringe 40 mg  Daily         04/17/24 1924    04/17/24 2015  magnesium " sulfate 4g/100mL (PREMIX) infusion  Once         04/17/24 1924 04/17/24 2015  piperacillin-tazobactam (ZOSYN) IVPB 3.375 g IVPB in 100 mL NS (VTB)  Once         04/17/24 1924 04/17/24 2015  sodium chloride 0.9 % infusion  Continuous         04/17/24 1924 04/17/24 2004  Inpatient Case Management  Consult  Once        Provider:  (Not yet assigned)    04/17/24 2003 04/17/24 2000  Vital Signs  Every 4 Hours      Comments: Per per hospital policy    04/17/24 1924 04/17/24 1930  ipratropium-albuterol (DUO-NEB) nebulizer solution 3 mL  4 Times Daily - RT         04/17/24 1924 04/17/24 1929  Connectors / Hubs Must Be Scrubbed 15 Seconds Using 70% Alcohol Before Access - Allow to Dry Before Accessing Line  Continuous         04/17/24 1928 04/17/24 1925  Notify Physician (with Parameters)  Until Discontinued         04/17/24 1924 04/17/24 1925  Intake & Output  Every Shift       04/17/24 1924 04/17/24 1925  Weigh patient  Once         04/17/24 1924 04/17/24 1925  Insert Peripheral IV  Once         04/17/24 1924 04/17/24 1925  Saline Lock & Maintain IV Access  Continuous,   Status:  Canceled         04/17/24 1924 04/17/24 1925  Continuous Cardiac Monitoring  Continuous        Comments: Follow Standing Orders As Outlined in Process Instructions (Open Order Report to View Full Instructions)    04/17/24 1924 04/17/24 1925  Maintain IV Access  Continuous         04/17/24 1924 04/17/24 1925  Telemetry - Place Orders & Notify Provider of Results When Patient Experiences Acute Chest Pain, Dysrhythmia or Respiratory Distress  Continuous        Comments: Open Order Report to View Parameters Requiring Provider Notification    04/17/24 1924 04/17/24 1925  May Be Off Telemetry for Tests  Continuous         04/17/24 1924 04/17/24 1925  Fall Precautions  Continuous         04/17/24 1924 04/17/24 1925  Aspiration Precautions  Continuous         04/17/24 1924 04/17/24 1925   "Nursing Dysphagia Screening (Complete Prior to Giving Anything By Mouth)  Once         04/17/24 1924    04/17/24 1925  NPO Diet NPO Type: Strict NPO  Diet Effective Now         04/17/24 1924 04/17/24 1925  NIPPV-IPPV / BIPAP / CPAP  At Bedtime & As Needed-RT,   Status:  Canceled         04/17/24 1924    04/17/24 1925  ECG 12 Lead Altered Mental Status  Now & in 6 Hours       04/17/24 1924 04/17/24 1924  nitroglycerin (NITROSTAT) SL tablet 0.4 mg  Every 5 Minutes PRN         04/17/24 1924    04/17/24 1924  polyethylene glycol (MIRALAX) packet 17 g  Daily PRN        Placed in \"And\" Linked Group    04/17/24 1924    04/17/24 1924  bisacodyl (DULCOLAX) EC tablet 5 mg  Daily PRN        Placed in \"And\" Linked Group    04/17/24 1924    04/17/24 1924  bisacodyl (DULCOLAX) suppository 10 mg  Daily PRN        Placed in \"And\" Linked Group    04/17/24 1924    04/17/24 1924  dextrose (GLUTOSE) oral gel 15 g  Every 15 Minutes PRN         04/17/24 1924    04/17/24 1924  dextrose (D50W) (25 g/50 mL) IV injection 25 g  Every 15 Minutes PRN         04/17/24 1924    04/17/24 1924  glucagon HCl (Diagnostic) injection 1 mg  Every 15 Minutes PRN         04/17/24 1924 04/17/24 1924  sodium chloride 0.9 % flush 10 mL  As Needed         04/17/24 1924 04/17/24 1924  sodium chloride 0.9 % infusion 40 mL  As Needed         04/17/24 1924 04/17/24 1840  Inpatient Admission  Once         04/17/24 1839    04/17/24 1804  Code Status and Medical Interventions:  Continuous         04/17/24 1806    04/17/24 1803  Initiate Observation Status  Once,   Status:  Canceled         04/17/24 1806    04/17/24 1801  cefTRIAXone (ROCEPHIN) 1,000 mg in sodium chloride 0.9 % 100 mL IVPB-VTB  Once         04/17/24 1745    04/17/24 1623  iopamidol (ISOVUE-370) 76 % injection 100 mL  Once in Imaging         04/17/24 1607    04/17/24 1608  High Sensitivity Troponin T 2Hr  PROCEDURE ONCE         04/17/24 1456    04/17/24 1555  sodium chloride 0.9 % bolus " 1,000 mL  Once         04/17/24 1539    04/17/24 1555  magnesium sulfate in D5W 1g/100mL (PREMIX)  Once         04/17/24 1539    04/17/24 1536  CT Angiogram Chest Pulmonary Embolism  1 Time Imaging         04/17/24 1535    04/17/24 1432  POC Glucose Once  PROCEDURE ONCE        Comments: Complete no more than 45 minutes prior to patient eating      04/17/24 1425    04/17/24 1425  Fentanyl, Urine - Urine, Clean Catch  Once         04/17/24 1424    04/17/24 1408  naloxone (NARCAN) injection 0.4 mg  Once         04/17/24 1352    04/17/24 1405  sodium chloride 0.9 % bolus 1,000 mL  Once         04/17/24 1349    04/17/24 1353  Blood Gas, Arterial With Co-Ox  PROCEDURE ONCE         04/17/24 1351    04/17/24 1350  Testosterone, Free, Total  Once         04/17/24 1349    04/17/24 1349  CT Head Without Contrast  1 Time Imaging         04/17/24 1348    04/17/24 1340  Ethanol  Once         04/17/24 1339    04/17/24 1340  Urine Drug Screen - Urine, Clean Catch  Once         04/17/24 1339    04/17/24 1340  Magnesium  Once         04/17/24 1339    04/17/24 1340  TSH  Once         04/17/24 1339    04/17/24 1339  COVID-19, FLU A/B, RSV PCR 1 HR TAT - Swab, Nasopharynx  Once         04/17/24 1339    04/17/24 1339  Undress & Gown  Once         04/17/24 1339    04/17/24 1339  Cardiac Monitoring  Continuous,   Status:  Canceled        Comments: Follow Standing Orders As Outlined in Process Instructions (Open Order Report to View Full Instructions)    04/17/24 1339    04/17/24 1339  Continuous Pulse Oximetry  Continuous         04/17/24 1339    04/17/24 1339  Measure Blood Pressure  Every 30 Minutes        Comments: Increase Frequency as Patient Condition Dictates and/or With Use of Vasoactive Treatments    04/17/24 1339    04/17/24 1339  Vital Signs  Every 30 Minutes        Comments: Increase Frequency as Patient Condition Dictates    04/17/24 1339    04/17/24 1339  ECG 12 Lead Other; Sepsis  Once         04/17/24 1339    04/17/24 1339   XR Chest 1 View  1 Time Imaging         04/17/24 1339    04/17/24 1339  Insert Peripheral IV  Once         04/17/24 1339    04/17/24 1339  Insert 2nd Large Bore Peripheral IV  Once         04/17/24 1339    04/17/24 1339  [place] Draw  Once         04/17/24 1339    04/17/24 1339  CBC & Differential  Once         04/17/24 1339    04/17/24 1339  Comprehensive Metabolic Panel  Once         04/17/24 1339    04/17/24 1339  Protime-INR  Once         04/17/24 1339    04/17/24 1339  aPTT  Once         04/17/24 1339    04/17/24 1339  Lactic Acid, Plasma  Once         04/17/24 1339    04/17/24 1339  Procalcitonin  Once         04/17/24 1339    04/17/24 1339  High Sensitivity Troponin T  Once         04/17/24 1339    04/17/24 1339  Blood Culture - Blood, Arm, Right  Once         04/17/24 1339    04/17/24 1339  Blood Culture - Blood, Arm, Left  Once         04/17/24 1339    04/17/24 1339  C-reactive Protein  Once         04/17/24 1339    04/17/24 1339  Blood Gas, Arterial -With Co-Ox Panel: Yes  Once         04/17/24 1339    04/17/24 1339  Urinalysis With Culture If Indicated - Urine, Clean Catch  Once         04/17/24 1339    04/17/24 1339  Green Top (Gel)  PROCEDURE ONCE         04/17/24 1339    04/17/24 1339  Lavender Top  PROCEDURE ONCE         04/17/24 1339    04/17/24 1339  Gold Top - SST  PROCEDURE ONCE         04/17/24 1339    04/17/24 1339  Light Blue Top  PROCEDURE ONCE         04/17/24 1339    04/17/24 1339  CBC Auto Differential  PROCEDURE ONCE         04/17/24 1339    04/17/24 1338  sodium chloride 0.9 % flush 10 mL  As Needed         04/17/24 1339    04/17/24 0000  Telemetry Scan  Once         04/17/24 0000    04/17/24 0000  Telemetry Scan  Once         04/17/24 0000    04/17/24 0000  Telemetry Scan  Once         04/17/24 0000    Unscheduled  Oxygen Therapy- Nasal Cannula; Titrate 1-6 LPM Per SpO2; 90 - 95%  Continuous PRN       04/17/24 1339    Unscheduled  Oxygen Therapy- Nasal Cannula; Titrate 1-6 LPM Per SpO2;  90 - 95%  Continuous PRN       04/17/24 1356    Unscheduled  Up With Assistance  As Needed       04/17/24 1924    Unscheduled  Follow Hypoglycemia Standing Orders For Blood Glucose <70 & Notify Provider of Treatment  As Needed      Comments: Follow Hypoglycemia Orders As Outlined in Process Instructions (Open Order Report to View Full Instructions)  Notify Provider Any Time Hypoglycemia Treatment is Administered    04/17/24 1924    Unscheduled  Blood Gas, Arterial -With Co-Ox Panel: Yes  As Needed       04/17/24 1924    Unscheduled  Change Dressing to IV Site As Needed When Damp, Loose or Soiled  As Needed       04/17/24 1928    Unscheduled  Change Needleless Connectors  As Needed      Comments: Change Needleless Connectors When:  - Administration Set Changed  - Dressing Changed  - Removed For Any Reason  - Residual Blood or Debris Within Connector  - Prior to Drawing Blood Cultures  - Contamination of Connector  - After Administration of Blood or Blood Components    04/17/24 1928    Unscheduled  Patient May Use Home CPAP / BIPAP For Sleep or As Needed  As Needed       04/17/24 5553

## 2024-04-18 NOTE — PROGRESS NOTES
[facility]     Progress Note    Patient Name: Stoney Ochoa  : 1980  MRN: [id number]  Primary Care Physician:  Provider, No Known  Date of admission: 2024    Subjective   Subjective     Chief Complaint: 44-year-old male being seen in follow-up for respiratory failure, sedation    History of Present Illness  Patient Reports overall feeling better today.  Patient was seen at bedside with JOSIE Lawson.  Patient manage more awake and alert today.    Patient did report taking extra Xanax's yesterday for which he is not prescribed.  This was not a suicide attempt.  The patient was saturating in the upper 90s via 2 L/min nasal cannula.  He denies any nausea or vomiting.    We discussed the patient's acute kidney injury was likely due to intermittent hypotension in the setting of Xanax overdose.  Patient    Review of Systems   Constitutional:  Negative for chills and fever.   HENT:  Negative for congestion and trouble swallowing.    Eyes:  Negative for visual disturbance.   Respiratory:  Positive for cough. Negative for shortness of breath.    Cardiovascular:  Negative for chest pain.   Gastrointestinal:  Negative for nausea and vomiting.   Genitourinary:  Positive for decreased urine volume.   Neurological:  Negative for weakness.   Psychiatric/Behavioral:  Negative for agitation and suicidal ideas.      Objective   Objective     Vitals:   Temp:  [97.5 °F (36.4 °C)-99.9 °F (37.7 °C)] 97.5 °F (36.4 °C)  Heart Rate:  [] 101  Resp:  [14-29] 20  BP: ()/(33-94) 106/59  Flow (L/min):  [2] 2    Physical Exam  Constitutional:       General: He is not in acute distress.     Appearance: He is well-developed and overweight.      Interventions: Nasal cannula in place.   HENT:      Head: Normocephalic and atraumatic.   Eyes:      Conjunctiva/sclera: Conjunctivae normal.   Neck:      Trachea: No tracheal deviation.   Cardiovascular:      Rate and Rhythm: Regular rhythm. Tachycardia present.      Pulses:            Dorsalis pedis pulses are 2+ on the right side and 2+ on the left side.      Heart sounds: No murmur heard.     No friction rub. No gallop.   Pulmonary:      Effort: No tachypnea, accessory muscle usage or respiratory distress.      Breath sounds: Examination of the right-lower field reveals decreased breath sounds. Examination of the left-lower field reveals decreased breath sounds. Decreased breath sounds present. No wheezing, rhonchi or rales.   Abdominal:      General: Bowel sounds are normal. There is no distension.      Palpations: Abdomen is soft.      Tenderness: There is no abdominal tenderness. There is no guarding.   Musculoskeletal:      Right lower leg: No edema.      Left lower leg: No edema.   Skin:     General: Skin is warm and dry.      Findings: No erythema or rash.   Neurological:      Mental Status: He is alert.      Cranial Nerves: No cranial nerve deficit.      Comments: Oriented to self/place; follows all commands          Result Review    Result Review:  I have personally reviewed the results from the time of this admission to 4/18/2024 13:36 EDT and agree with these findings:  [x]  Laboratory list / accordion  []  Microbiology  [x]  Radiology  []  EKG/Telemetry   []  Cardiology/Vascular   []  Pathology  []  Old records  []  Other:  Most notable findings include:   Results from last 7 days   Lab Units 04/18/24  0103 04/17/24  1408   WBC 10*3/mm3 16.86* 11.32*   HEMOGLOBIN g/dL 15.9 17.0   HEMATOCRIT % 48.8 51.7*   PLATELETS 10*3/mm3 201 190     Results from last 7 days   Lab Units 04/18/24  0103 04/17/24  1408   SODIUM mmol/L 137 141   POTASSIUM mmol/L 5.4* 4.2   CHLORIDE mmol/L 105 103   CO2 mmol/L 24.3 28.1   BUN mg/dL 18 10   CREATININE mg/dL 2.18* 1.39*   CALCIUM mg/dL 8.7 9.2   BILIRUBIN mg/dL 0.9 0.4   ALK PHOS U/L 91 102   ALT (SGPT) U/L 25 30   AST (SGOT) U/L 26 36   GLUCOSE mg/dL 143* 107*       Assessment / Plan     #Acute kidney injury, suspect due to hypotension +/- medication  induced/ACEI  #Sepsis due to bilateral aspiration pneumonia  #Unintentional benzodiazepine overdose  #Acute hypoxemic respiratory failure due to pneumonia and benzodiazepine overdose  #Acute encephalopathy, present on admission and suspect toxic/due to sedative  #Chronic suboxone therapy  #Mild hyperkalemia  #Mild hypomagnesemia now with mild hypermagnesemia  -Patient has received 2 L NS bolus over 2 hours  -I have increased his maintenance fluids from 75 mL/hour to 125 ml/hr  -Hold nephrotoxic medications; per review of patient's home medication list will plan to hold his lisinopril and meloxicam  -Obtain urine electrolytes  -If no improvement in renal failure with IV fluids alone, will plan for renal imaging in a.m.   -Renally adjust medications based on current eGFR  -Continue with IV Zosyn  -Suspect leukocytosis is steroid-induced, will plan to give an additional dose of Solu-Medrol tonight  -Continue with scheduled nebulized inhalants  -CPAP at night and as needed  -Continue with suboxone therapy for now  -Will consider resuming home gabapentin at decreased dosing  -Will repeat CBC and CMP    Chronic medical conditions:  -BPH  -Depression    DVT prophylaxis:  Lovenox    CODE STATUS:    Code Status (Patient has no pulse and is not breathing): CPR (Attempt to Resuscitate)  Medical Interventions (Patient has pulse or is breathing): Full Support    Disposition:  I expect patient to be discharged home when medically stable/when renal failure stabilizes; hopefully home in 1-2 days.    Radha Benoit,

## 2024-04-19 VITALS
DIASTOLIC BLOOD PRESSURE: 67 MMHG | RESPIRATION RATE: 18 BRPM | SYSTOLIC BLOOD PRESSURE: 127 MMHG | BODY MASS INDEX: 36.78 KG/M2 | HEART RATE: 73 BPM | HEIGHT: 76 IN | OXYGEN SATURATION: 94 % | TEMPERATURE: 96.9 F | WEIGHT: 302.03 LBS

## 2024-04-19 PROBLEM — J96.90 RESPIRATORY FAILURE: Status: RESOLVED | Noted: 2024-04-17 | Resolved: 2024-04-19

## 2024-04-19 LAB
ALBUMIN SERPL-MCNC: 3.4 G/DL (ref 3.5–5.2)
ALBUMIN/GLOB SERPL: 1.4 G/DL
ALP SERPL-CCNC: 72 U/L (ref 39–117)
ALT SERPL W P-5'-P-CCNC: 22 U/L (ref 1–41)
ANION GAP SERPL CALCULATED.3IONS-SCNC: 7.3 MMOL/L (ref 5–15)
AST SERPL-CCNC: 26 U/L (ref 1–40)
BASOPHILS # BLD AUTO: 0.01 10*3/MM3 (ref 0–0.2)
BASOPHILS NFR BLD AUTO: 0.1 % (ref 0–1.5)
BILIRUB SERPL-MCNC: 0.5 MG/DL (ref 0–1.2)
BUN SERPL-MCNC: 19 MG/DL (ref 6–20)
BUN/CREAT SERPL: 15.6 (ref 7–25)
CALCIUM SPEC-SCNC: 8.5 MG/DL (ref 8.6–10.5)
CHLORIDE SERPL-SCNC: 109 MMOL/L (ref 98–107)
CO2 SERPL-SCNC: 20.7 MMOL/L (ref 22–29)
CREAT SERPL-MCNC: 1.22 MG/DL (ref 0.76–1.27)
DEPRECATED RDW RBC AUTO: 47.4 FL (ref 37–54)
EGFRCR SERPLBLD CKD-EPI 2021: 74 ML/MIN/1.73
EOSINOPHIL # BLD AUTO: 0 10*3/MM3 (ref 0–0.4)
EOSINOPHIL NFR BLD AUTO: 0 % (ref 0.3–6.2)
ERYTHROCYTE [DISTWIDTH] IN BLOOD BY AUTOMATED COUNT: 14.3 % (ref 12.3–15.4)
GLOBULIN UR ELPH-MCNC: 2.4 GM/DL
GLUCOSE BLDC GLUCOMTR-MCNC: 148 MG/DL (ref 70–130)
GLUCOSE BLDC GLUCOMTR-MCNC: 175 MG/DL (ref 70–130)
GLUCOSE SERPL-MCNC: 189 MG/DL (ref 65–99)
HCT VFR BLD AUTO: 43 % (ref 37.5–51)
HGB BLD-MCNC: 13.9 G/DL (ref 13–17.7)
IMM GRANULOCYTES # BLD AUTO: 0.08 10*3/MM3 (ref 0–0.05)
IMM GRANULOCYTES NFR BLD AUTO: 0.7 % (ref 0–0.5)
LYMPHOCYTES # BLD AUTO: 0.53 10*3/MM3 (ref 0.7–3.1)
LYMPHOCYTES NFR BLD AUTO: 4.5 % (ref 19.6–45.3)
MCH RBC QN AUTO: 29.2 PG (ref 26.6–33)
MCHC RBC AUTO-ENTMCNC: 32.3 G/DL (ref 31.5–35.7)
MCV RBC AUTO: 90.3 FL (ref 79–97)
MONOCYTES # BLD AUTO: 0.63 10*3/MM3 (ref 0.1–0.9)
MONOCYTES NFR BLD AUTO: 5.3 % (ref 5–12)
NEUTROPHILS NFR BLD AUTO: 10.53 10*3/MM3 (ref 1.7–7)
NEUTROPHILS NFR BLD AUTO: 89.4 % (ref 42.7–76)
NRBC BLD AUTO-RTO: 0 /100 WBC (ref 0–0.2)
PLATELET # BLD AUTO: 153 10*3/MM3 (ref 140–450)
PMV BLD AUTO: 9.6 FL (ref 6–12)
POTASSIUM SERPL-SCNC: 4.8 MMOL/L (ref 3.5–5.2)
PROT SERPL-MCNC: 5.8 G/DL (ref 6–8.5)
RBC # BLD AUTO: 4.76 10*6/MM3 (ref 4.14–5.8)
SODIUM SERPL-SCNC: 137 MMOL/L (ref 136–145)
WBC NRBC COR # BLD AUTO: 11.78 10*3/MM3 (ref 3.4–10.8)

## 2024-04-19 PROCEDURE — 97162 PT EVAL MOD COMPLEX 30 MIN: CPT

## 2024-04-19 PROCEDURE — 99239 HOSP IP/OBS DSCHRG MGMT >30: CPT | Performed by: INTERNAL MEDICINE

## 2024-04-19 PROCEDURE — 25810000003 SODIUM CHLORIDE 0.9 % SOLUTION: Performed by: INTERNAL MEDICINE

## 2024-04-19 PROCEDURE — 80053 COMPREHEN METABOLIC PANEL: CPT | Performed by: INTERNAL MEDICINE

## 2024-04-19 PROCEDURE — 94799 UNLISTED PULMONARY SVC/PX: CPT

## 2024-04-19 PROCEDURE — 82948 REAGENT STRIP/BLOOD GLUCOSE: CPT

## 2024-04-19 PROCEDURE — 94761 N-INVAS EAR/PLS OXIMETRY MLT: CPT

## 2024-04-19 PROCEDURE — 25010000002 PIPERACILLIN SOD-TAZOBACTAM PER 1 G: Performed by: INTERNAL MEDICINE

## 2024-04-19 PROCEDURE — 25010000002 ENOXAPARIN PER 10 MG: Performed by: INTERNAL MEDICINE

## 2024-04-19 PROCEDURE — 85025 COMPLETE CBC W/AUTO DIFF WBC: CPT | Performed by: INTERNAL MEDICINE

## 2024-04-19 RX ORDER — LISINOPRIL 40 MG/1
40 TABLET ORAL DAILY PRN
Start: 2024-04-19

## 2024-04-19 RX ORDER — AMOXICILLIN AND CLAVULANATE POTASSIUM 875; 125 MG/1; MG/1
1 TABLET, FILM COATED ORAL 2 TIMES DAILY
Qty: 6 TABLET | Refills: 0 | Status: SHIPPED | OUTPATIENT
Start: 2024-04-19

## 2024-04-19 RX ORDER — MELOXICAM 15 MG/1
15 TABLET ORAL DAILY PRN
Start: 2024-04-19

## 2024-04-19 RX ADMIN — PIPERACILLIN SODIUM AND TAZOBACTAM SODIUM 3.38 G: 3; .375 INJECTION, POWDER, LYOPHILIZED, FOR SOLUTION INTRAVENOUS at 02:04

## 2024-04-19 RX ADMIN — Medication 10 ML: at 08:27

## 2024-04-19 RX ADMIN — FLUOXETINE HYDROCHLORIDE 10 MG: 10 CAPSULE ORAL at 08:27

## 2024-04-19 RX ADMIN — BUPRENORPHINE HYDROCHLORIDE AND NALOXONE HYDROCHLORIDE DIHYDRATE 2 TABLET: 8; 2 TABLET SUBLINGUAL at 08:27

## 2024-04-19 RX ADMIN — SODIUM CHLORIDE 125 ML/HR: 9 INJECTION, SOLUTION INTRAVENOUS at 05:53

## 2024-04-19 RX ADMIN — IPRATROPIUM BROMIDE AND ALBUTEROL SULFATE 3 ML: 2.5; .5 SOLUTION RESPIRATORY (INHALATION) at 00:18

## 2024-04-19 RX ADMIN — ENOXAPARIN SODIUM 40 MG: 40 INJECTION SUBCUTANEOUS at 08:27

## 2024-04-19 NOTE — PAYOR COMM NOTE
"[facility]  NPI:[id number]    Utilization Review  Contact: Marie Kaiser RN  Phone: [phone]  Fax:[phone]    DISCHARGE NOTIFICATION    Stoney Mcintosh (44 y.o. Male)       Date of Birth   1980    Social Security Number       Address   [address] [place] [address]    Home Phone   [phone]    MRN   [id number]       Hinduism   None    Marital Status                               Admission Date   4/17/24    Admission Type   Emergency    Admitting Provider   Radha Benoit DO    Attending Provider       Department, Room/Bed   [facility] PROGRESS CARE, P210/S2       Discharge Date   4/19/2024    Discharge Disposition   Home or Self Care    Discharge Destination   Home                              Attending Provider: (none)   Allergies: Tetracycline    Isolation: None   Infection: None   Code Status: Prior    Ht: 193 cm (76\")   Wt: 137 kg (302 lb 0.5 oz)    Admission Cmt: None   Principal Problem: Respiratory failure [J96.90]                   Active Insurance as of 4/17/2024       Primary Coverage       Payor Plan Insurance Group Employer/Plan Group    MISC COMM[employer] [facility]        Coverage Address Coverage Phone Number Coverage Fax Number Effective Dates    [address] [phone]  4/17/2024 - None Entered    [facility] [id number]         Subscriber Name Subscriber Birth Date Member ID       STONEY MCINTOSH 1980 [id number]                     Emergency Contacts        (Rel.) Home Phone Work Phone Mobile Phone    JAYLIN BREWER (Spouse) [phone] -- --    Sheron Mcintosh (Mother) -- -- [phone]    DonAbdiel shepherd (Father) -- -- [phone]             Radha Benoit DO   Physician  Hospitalist     Discharge Summary     Incomplete     Date of Service: 04/19/24 0910  Creation Time: 04/19/24 0910     Incomplete              [facility]OSPITAL[facility] DISCHARGE SUMMARY     Patient " Identification:  Name:  Stoney Ochoa  Age:  44 y.o.  Sex:  male  :  1980  MRN:  [id number]  Visit Number:  [id number]     Date of Admission: 2024  Date of Discharge:       PCP: Provider, No Known     DISCHARGE DIAGNOSIS        CONSULTS         PROCEDURES PERFORMED        HOSPITAL COURSE  Patient is a 44 y.o. male presented to [facility] complaining of .  Please see the admitting history and physical for further details.             VITAL SIGNS:  Temp:  [96.9 °F (36.1 °C)-99 °F (37.2 °C)] 96.9 °F (36.1 °C)  Heart Rate:  [] 73  Resp:  [12-20] 18  BP: ()/(47-99) 127/67  SpO2:  [92 %-100 %] 94 %  on   ;   Device (Oxygen Therapy): room air     Body mass index is 36.76 kg/m².      Wt Readings from Last 3 Encounters:   24 (!) 137 kg (302 lb 0.5 oz)         PHYSICAL EXAM:  Physical Exam      DISCHARGE DISPOSITION   Stable           Diet Instructions         Diet: Cardiac Diets; Healthy Heart (2-3 Na+); Thin (IDDSI 0)       Discharge Diet: Cardiac Diets     Cardiac Diet: Healthy Heart (2-3 Na+)     Fluid Consistency: Thin (IDDSI 0)             Activity Instructions         Gradually Increase Activity Until at Pre-Hospitalization Level       Measure Blood Pressure               No future appointments.     Additional Instructions for the Follow-ups that You Need to Schedule         Discharge Follow-up with PCP   As directed         Currently Documented PCP:    Provider, No Known    PCP Phone Number:    [phone]      Follow Up Details: 1 week; hospital follow up PRAKASH, Lethargy, Pneumonia           Basic Metabolic Panel    2024 (Approximate)        Follow up acute kidney injury     Order Comments: Follow up acute kidney injury    Release to patient: Routine Release                     Follow-up Information         Provider, No Known .    Why: 1 week; hospital follow up PRAKASH, Lethargy, Pneumonia  Contact information:  [facility] [place] [address]  [phone]                                       TEST  RESULTS PENDING AT DISCHARGE  Pending Labs         Order Current Status     Testosterone, Free, Total In process     Blood Culture - Blood, Arm, Left Preliminary result     Blood Culture - Blood, Arm, Right Preliminary result                CODE STATUS      Code Status and Medical Interventions:   Ordered at: 04/17/24 1806     Code Status (Patient has no pulse and is not breathing):     CPR (Attempt to Resuscitate)     Medical Interventions (Patient has pulse or is breathing):     Full Support         Radha Benoit DO  04/19/24  09:10 EDT     Please note that this discharge summary required more than 30 minutes to complete.     Please send a copy of this dictation to the following providers:  Provider, No Known

## 2024-04-19 NOTE — THERAPY EVALUATION
Acute Care - Physical Therapy Initial Evaluation   Alessio     Patient Name: Stoney Ochoa  : 1980  MRN: [id number]  Today's Date: 2024   Onset of Illness/Injury or Date of Surgery: 24  Visit Dx:     ICD-10-CM ICD-9-CM   1. Obstructive sleep apnea  G47.33 327.23   2. Polypharmacy  Z79.899 V58.69   3. Community acquired pneumonia, unspecified laterality  J18.9 486   4. PRAKASH (acute kidney injury)  N17.9 584.9     Patient Active Problem List   Diagnosis   (none) - all problems resolved or deleted     Past Medical History:   Diagnosis Date    Depression     Opiate dependence     KANDICE (obstructive sleep apnea)      History reviewed. No pertinent surgical history.  PT Assessment (Last 12 Hours)       PT Evaluation and Treatment       Row Name 24 0931          Physical Therapy Time and Intention    Subjective Information no complaints  -CS     Document Type evaluation  -CS     Mode of Treatment physical therapy  -CS     Patient Effort adequate  -CS     Comment Pt seen bedside for evaluation this AM. Pt was (I) w/ all mobility prior to admission. Pt agreed to evaluation.  -CS       Row Name 24 0931          General Information    Patient Profile Reviewed yes  -CS     Onset of Illness/Injury or Date of Surgery 24  -CS     Referring Physician Kaycee  -CS     Patient Observations alert;cooperative;agree to therapy  -CS     General Observations of Patient Pt supine, all PCU lines intact  -CS     Risks Reviewed patient:;LOB;nausea/vomiting;dizziness;increased discomfort;change in vital signs;lines disloged  -CS     Benefits Reviewed patient:;improve function;increase independence;increase strength;increase balance;decrease pain;decrease risk of DVT;improve skin integrity;increase knowledge  -CS       Row Name 24 0931          Pain    Pre/Posttreatment Pain Comment no c/o  -CS       Row Name 2431          Cognition    Orientation Status (Cognition) oriented x 4  -CS       Row Name  04/19/24 0931          Range of Motion (ROM)    Range of Motion bilateral lower extremities;ROM is WFL  -CS       Row Name 04/19/24 0931          Strength Comprehensive (MMT)    Comment, General Manual Muscle Testing (MMT) Assessment (B)LE WFL  -CS       Row Name 04/19/24 0931          Bed Mobility    Bed Mobility bed mobility (all) activities  -CS     All Activities, [place] (Bed Mobility) standby assist;independent  -CS     Assistive Device (Bed Mobility) bed rails;draw sheet;head of bed elevated  -CS       Row Name 04/19/24 0931          Transfers    Transfers sit-stand transfer;stand-sit transfer  -CS     Comment, (Transfers) Independent  -CS       Row Name 04/19/24 0931          Sit-Stand Transfer    Sit-Stand [place] (Transfers) standby assist;independent  -CS       Row Name 04/19/24 0931          Stand-Sit Transfer    Stand-Sit [place] (Transfers) standby assist;independent  -CS       Row Name 04/19/24 0931          Gait/Stairs (Locomotion)    Gait/Stairs Locomotion gait/ambulation independence;distance ambulated  -CS     [place] Level (Gait) contact guard;independent  -CS     Patient was able to Ambulate yes  -CS     Distance in Feet (Gait) 25  -CS     Pattern (Gait) step-through  -CS     Comment, (Gait/Stairs) normal reciprocal gait  -CS       Row Name 04/19/24 0931          Plan of Care Review    Plan of Care Reviewed With patient  -CS     Progress no change  -CS     Outcome Evaluation Pt presents w/ baseline mobility. No skilled inpatient PT needs at this time. Anticipate d/c home.  -CS       Row Name 04/19/24 0931          Positioning and Restraints    Pre-Treatment Position in bed  -CS     Post Treatment Position bed  -CS     In Bed supine;call light within reach;encouraged to call for assist;exit alarm on  -CS       Row Name 04/19/24 0931          Therapy Assessment/Plan (PT)    Functional Level at Time of Evaluation (PT) Independent  -CS     Criteria for Skilled Interventions Met  (PT) no problems identified which require skilled intervention;does not meet criteria for skilled intervention  -CS     Therapy Frequency (PT) evaluation only  -CS     Comment, Therapy Assessment/Plan (PT) Pt presents w/ baseline mobility. No skilled inpatient PT needs at this time. Anticipate d/c home.  -CS       Row Name 04/19/24 0931          PT Evaluation Complexity    History, PT Evaluation Complexity 3 or more personal factors and/or comorbidities  -CS     Examination of Body Systems (PT Eval Complexity) total of 4 or more elements  -CS     Clinical Presentation (PT Evaluation Complexity) evolving  -CS     Clinical Decision Making (PT Evaluation Complexity) moderate complexity  -CS     Overall Complexity (PT Evaluation Complexity) moderate complexity  -CS       Row Name 04/19/24 0931          Therapy Plan Review/Discharge Plan (PT)    Therapy Plan Review (PT) evaluation/treatment results reviewed;care plan/treatment goals reviewed;risks/benefits reviewed;current/potential barriers reviewed;participants voiced agreement with care plan;participants included;patient  -CS       Row Name 04/19/24 0931          Physical Therapy Goals    Transfer Goal Selection (PT) transfer, PT goal 1  -CS     Gait Training Goal Selection (PT) gait training, PT goal 1  -CS       Row Name 04/19/24 0931          Transfer Goal 1 (PT)    Activity/Assistive Device (Transfer Goal 1, PT) transfers, all  -CS     [place] Level/Cues Needed (Transfer Goal 1, PT) standby assist  -CS     Time Frame (Transfer Goal 1, PT) long term goal (LTG);by discharge  -CS     Progress/Outcome (Transfer Goal 1, PT) goal met  -CS       Row Name 04/19/24 0931          Gait Training Goal 1 (PT)    Activity/Assistive Device (Gait Training Goal 1, PT) gait (walking locomotion);assistive device use  -CS     [place] Level (Gait Training Goal 1, PT) standby assist  -CS     Distance (Gait Training Goal 1, PT) ad madhuri  -CS     Time Frame (Gait Training Goal 1,  PT) long term goal (LTG);by discharge  -     Progress/Outcome (Gait Training Goal 1, PT) goal met  -       Row Name 04/19/24 0931          Discharge Summary (PT)    Additional Documentation --  D/C acute PT.  -               User Key  (r) = Recorded By, (t) = Taken By, (c) = Cosigned By      Initials Name Provider Type    James Cruz, PT Physical Therapist                    Physical Therapy Education       Title: PT OT SLP Therapies (Resolved)       Topic: Physical Therapy (Resolved)       Point: Mobility training (Resolved)       Learning Progress Summary             Patient Acceptance, E,TB, VU by  at 4/19/2024 0938                         Point: Home exercise program (Resolved)       Learning Progress Summary             Patient Acceptance, E,TB, VU by  at 4/19/2024 0938                         Point: Body mechanics (Resolved)       Learning Progress Summary             Patient Acceptance, E,TB, VU by  at 4/19/2024 0938                         Point: Precautions (Resolved)       Learning Progress Summary             Patient Acceptance, E,TB, VU by  at 4/19/2024 0938                                         User Key       Initials Effective Dates Name Provider Type [facility] 05/31/23 -  James Davis, PT Physical Therapist PT                  PT Recommendation and Plan  Anticipated Discharge Disposition (PT): home  Therapy Frequency (PT): evaluation only  Plan of Care Reviewed With: patient  Progress: no change  Outcome Evaluation: Pt presents w/ baseline mobility. No skilled inpatient PT needs at this time. Anticipate d/c home.       Time Calculation:    PT Charges       Row Name 04/19/24 0938             Time Calculation    Start Time 0845  -      PT Received On 04/19/24  -                User Key  (r) = Recorded By, (t) = Taken By, (c) = Cosigned By      Initials Name Provider Type    James Cruz, PT Physical Therapist                  Therapy Charges for Today       Code  Description Service Date Service Provider Modifiers Qty    36112204566 HC PT EVAL MOD COMPLEXITY 4 4/19/2024 James Davis, PT GP 1            PT G-Codes  AM-PAC 6 Clicks Score (PT): 19    James Davis, PT  4/19/2024

## 2024-04-19 NOTE — DISCHARGE SUMMARY
[facility] HOSPITALISTS DISCHARGE SUMMARY    Patient Identification:  Name:  Stoney Ochoa  Age:  44 y.o.  Sex:  male  :  1980  MRN:  [id number]  Visit Number:  [id number]    Date of Admission: 2024  Date of Discharge:  2024    PCP: Provider, No Known    DISCHARGE DIAGNOSIS  #Unintentional benzodiazepine overdose  #Acute kidney injury suspect due to intermittent hypotension in the setting of sedation and/or medication induced, resolved  #Acute hypoxemic respiratory failure due to pneumonia and benzodiazepine overdose  #Systemic inflammatory response syndrome with tachycardia and tachypnea upon admission and source of infection/aspiration pneumonia, sepsis due to pneumonia  #Acute metabolic encephalopathy, present on admission and suspect toxic/due to sedative  #Mild hyperkalemia, resolved  #Essential hypertension  #Chronic Suboxone therapy  #Obstructive sleep apnea, compliant with CPAP  #Obesity per BMI 36.76 kg/m²    CONSULTS   None    PROCEDURES PERFORMED  None    HOSPITAL COURSE  Patient is a 44 y.o. male presented to [facility] complaining of somnolence.  Please see the admitting history and physical for further details.    Workup in the emergency department revealed acute hypoxemic respiratory failure in the setting of unintentional benzodiazepine overdose.  Patient was admitted to the progressive care unit as he was placed on BiPAP in the emergency department and his initial respiratory status was very tenuous.    Patient was started on empiric antibiotic therapy with IV Zosyn.  Patient was also started on scheduled nebulized inhalants and was given a brief course of intravenous steroids.  Patient was started on IV fluid hydration.  Due to patient's sedative use and with chronic medication use of lisinopril, patient did have intermittent episodes of hypotension and on the day following admission, patient was noted to have acute kidney injury.  Patient was given IV  boluses of normal saline and continued on maintenance fluids with rate infusion increased to 125 mL/hour.    Patient was more alert and oriented on the day after admission and had been weaned down to nasal cannula.  Patient's home lisinopril and meloxicam were held initially upon admission.    Patient was noted to have some very mild hyperkalemia which resolved by the day of discharge.  Patient was also noted to have some mild hyperglycemia but this was in the setting of IV Solu-Medrol.    On the day of discharge, the patient's creatinine was much improved to 1.22.  The patient was alert and oriented to self, place and situation.  Patient was ambulatory in his room and was tolerating his diet.  Discussion and education with patient and significant other at bedside regarding the effects of benzodiazepine use/misuse especially in the setting of obesity and obstructive sleep apnea.  Patient and significant other verbalized understanding and it was felt that the patient had maximized the benefit of his inpatient hospital stay.    I have requested that patient obtain a repeat BMP within 3 to 5 days postdischarge.  I have asked the patient to monitor his blood pressure at home and only take his lisinopril if parameters (see below) are met.  I have also asked the patient to hold his meloxicam unless absolutely needed until he can obtain a repeat basic metabolic panel.  Patient was converted to Augmentin and will complete a short course of antibiotic therapy.    Patient was discharged home in a hemodynamically stable condition.    VITAL SIGNS:  Temp:  [96.9 °F (36.1 °C)-99 °F (37.2 °C)] 96.9 °F (36.1 °C)  Heart Rate:  [] 73  Resp:  [12-20] 18  BP: ()/(47-99) 127/67  SpO2:  [92 %-100 %] 94 %  on   ;   Device (Oxygen Therapy): room air    Body mass index is 36.76 kg/m².  Wt Readings from Last 3 Encounters:   04/19/24 (!) 137 kg (302 lb 0.5 oz)       Results from last 7 days   Lab Units 04/19/24  1617  04/18/24  0103 04/17/24  1408   SODIUM mmol/L 137 137 141   POTASSIUM mmol/L 4.8 5.4* 4.2   CHLORIDE mmol/L 109* 105 103   CO2 mmol/L 20.7* 24.3 28.1   BUN mg/dL 19 18 10   CREATININE mg/dL 1.22 2.18* 1.39*   CALCIUM mg/dL 8.5* 8.7 9.2   BILIRUBIN mg/dL 0.5 0.9 0.4   ALK PHOS U/L 72 91 102   ALT (SGPT) U/L 22 25 30   AST (SGOT) U/L 26 26 36   GLUCOSE mg/dL 189* 143* 107*       PHYSICAL EXAM:  Physical Exam  Constitutional:       General: He is not in acute distress.     Appearance: He is well-developed.   HENT:      Head: Normocephalic and atraumatic.   Eyes:      Conjunctiva/sclera: Conjunctivae normal.   Neck:      Trachea: No tracheal deviation.   Cardiovascular:      Rate and Rhythm: Normal rate and regular rhythm.      Heart sounds: No murmur heard.     No friction rub. No gallop.   Pulmonary:      Effort: No respiratory distress.      Breath sounds: Normal breath sounds. No wheezing or rales.   Abdominal:      General: Bowel sounds are normal. There is no distension.      Palpations: Abdomen is soft.      Tenderness: There is no abdominal tenderness. There is no guarding.   Musculoskeletal:         General: No tenderness.      Right lower leg: No edema.      Left lower leg: No edema.      Comments: Mild edema in hands   Skin:     General: Skin is warm and dry.      Findings: No erythema or rash.   Neurological:      Mental Status: He is alert and oriented to person, place, and time.      Cranial Nerves: No cranial nerve deficit.          DISCHARGE DISPOSITION   Stable      DISCHARGE MEDICATIONS:  Current Outpatient Medications   Medication Sig Dispense Refill    lisinopril (PRINIVIL,ZESTRIL) 40 MG tablet Take 1 tablet by mouth Daily As Needed (for sustained BP greater than or equal to 140/90 mmHg).      meloxicam (MOBIC) 15 MG tablet Take 1 tablet by mouth Daily As Needed for Moderate Pain.      amoxicillin-clavulanate (AUGMENTIN) 875-125 MG per tablet Take 1 tablet by mouth 2 (Two) Times a Day. 6 tablet 0     buprenorphine-naloxone (SUBOXONE) 8-2 MG film film Place 2 films under the tongue Daily.      FLUoxetine (PROzac) 10 MG capsule Take 1 capsule by mouth Daily.      gabapentin (NEURONTIN) 300 MG capsule Take 1 capsule by mouth 2 (Two) Times a Day.      gabapentin (NEURONTIN) 300 MG capsule Take 2 capsules by mouth every night at bedtime.      sertraline (ZOLOFT) 50 MG tablet Take 1 tablet by mouth Daily.         Diet Instructions       Diet: Cardiac Diets; Healthy Heart (2-3 Na+); Thin (IDDSI 0)      Discharge Diet: Cardiac Diets    Cardiac Diet: Healthy Heart (2-3 Na+)    Fluid Consistency: Thin (IDDSI 0)          Activity Instructions       Gradually Increase Activity Until at Pre-Hospitalization Level      Measure Blood Pressure              Additional Instructions for the Follow-ups that You Need to Schedule       Discharge Follow-up with PCP   As directed       Currently Documented PCP:    Provider, No Known    PCP Phone Number:    [phone]     Follow Up Details: 1 week; hospital follow up PRAKASH, Lethargy, Pneumonia        Basic Metabolic Panel    Apr 24, 2024 (Approximate)      Follow up acute kidney injury    Order Comments: Follow up acute kidney injury    Release to patient: Routine Release               Follow-up Information       Provider, No Known .    Why: 1 week; hospital follow up PRAKASH, Lethargy, Pneumonia  Contact information:  [place] [id number] [phone]                              TEST  RESULTS PENDING AT DISCHARGE  Pending Labs       Order Current Status    Testosterone, Free, Total In process    Blood Culture - Blood, Arm, Left Preliminary result    Blood Culture - Blood, Arm, Right Preliminary result             CODE STATUS  Code Status and Medical Interventions:   Ordered at: 04/17/24 1805     Code Status (Patient has no pulse and is not breathing):    CPR (Attempt to Resuscitate)     Medical Interventions (Patient has pulse or is breathing):    Full Support        Radha Benoit DO  04/19/24  09:10 EDT    Please note that this discharge summary required more than 30 minutes to complete.

## 2024-04-19 NOTE — PLAN OF CARE
Goal Outcome Evaluation:  Plan of Care Reviewed With: patient        Progress: no change  Outcome Evaluation: Pt presents w/ baseline mobility. No skilled inpatient PT needs at this time. Anticipate d/c home.      Anticipated Discharge Disposition (PT): home

## 2024-04-19 NOTE — CASE MANAGEMENT/SOCIAL WORK
Continued Stay Note   Alessio     Patient Name: Stoney Ochoa  MRN: [id number]  Today's Date: 4/19/2024    Admit Date: 4/17/2024    Plan: Spoke with patients family at bedside. Patient lives alone in [place], [place]. Patients spouse Arabella Hand [phone] in [place] is listed as contact. Patients parents were at bedside and was added to patients contacts. Patient has no POA but they stated he had a Living Will on his cell phone. Patient uses a cpap from unknown supplier. Patient has no oxygen or HH. Patient has no PCP or pharmacy. Patient will return to [place] when discharged and friend/family will transport.   Discharge Plan       Row Name 04/19/24 0900       Plan    Final Discharge Disposition Code 01 - home or self-care    Final Note Patient is being discharged home on this date 4/19/24.  No needs identified.                         Expected Discharge Date and Time       Expected Discharge Date Expected Discharge Time    Apr 19, 2024               Елена Thomas RN

## 2024-04-19 NOTE — PLAN OF CARE
Goal Outcome Evaluation:           Progress: improving  Outcome Evaluation: Patient AOx4. VSS at this time on room air. IVF and antibiotics infusing per orders. Family at bedside. Patient has no complaints at this time. Bed low, locked, and call light in reach.

## 2024-04-19 NOTE — DISCHARGE INSTR - APPOINTMENTS
Try to schedule PT as a new PT for a PCP, PT stated he has appt with his PCP in [place] on Tuesday, and PT stated he will find his own PCP when he fully moves to [place]

## 2024-04-22 LAB
BACTERIA SPEC AEROBE CULT: NORMAL
BACTERIA SPEC AEROBE CULT: NORMAL

## 2024-04-23 LAB
TESTOST FREE SERPL-MCNC: 24.5 PG/ML (ref 6.8–21.5)
TESTOST SERPL-MCNC: >1500 NG/DL (ref 264–916)